# Patient Record
Sex: MALE | Race: WHITE | NOT HISPANIC OR LATINO | Employment: OTHER | URBAN - NONMETROPOLITAN AREA
[De-identification: names, ages, dates, MRNs, and addresses within clinical notes are randomized per-mention and may not be internally consistent; named-entity substitution may affect disease eponyms.]

---

## 2020-01-01 ENCOUNTER — APPOINTMENT (INPATIENT)
Dept: RADIOLOGY | Facility: HOSPITAL | Age: 65
DRG: 291 | End: 2020-01-01
Payer: COMMERCIAL

## 2020-01-01 ENCOUNTER — APPOINTMENT (INPATIENT)
Dept: NON INVASIVE DIAGNOSTICS | Facility: HOSPITAL | Age: 65
DRG: 871 | End: 2020-01-01
Payer: COMMERCIAL

## 2020-01-01 ENCOUNTER — HOSPITAL ENCOUNTER (OUTPATIENT)
Dept: MRI IMAGING | Facility: HOSPITAL | Age: 65
Discharge: HOME | End: 2020-10-07
Payer: COMMERCIAL

## 2020-01-01 ENCOUNTER — APPOINTMENT (INPATIENT)
Dept: MRI IMAGING | Facility: HOSPITAL | Age: 65
DRG: 291 | End: 2020-01-01
Payer: COMMERCIAL

## 2020-01-01 ENCOUNTER — APPOINTMENT (OUTPATIENT)
Dept: LAB | Facility: HOSPITAL | Age: 65
End: 2020-01-01
Attending: INTERNAL MEDICINE
Payer: COMMERCIAL

## 2020-01-01 ENCOUNTER — HOSPITAL ENCOUNTER (OUTPATIENT)
Dept: CT IMAGING | Facility: HOSPITAL | Age: 65
End: 2020-01-01
Payer: COMMERCIAL

## 2020-01-01 ENCOUNTER — APPOINTMENT (INPATIENT)
Dept: ULTRASOUND IMAGING | Facility: HOSPITAL | Age: 65
DRG: 291 | End: 2020-01-01
Payer: COMMERCIAL

## 2020-01-01 ENCOUNTER — HOSPITAL ENCOUNTER (OUTPATIENT)
Dept: CT IMAGING | Facility: HOSPITAL | Age: 65
Discharge: HOME | End: 2020-09-25
Payer: COMMERCIAL

## 2020-01-01 ENCOUNTER — APPOINTMENT (INPATIENT)
Dept: CT IMAGING | Facility: HOSPITAL | Age: 65
DRG: 291 | End: 2020-01-01
Payer: COMMERCIAL

## 2020-01-01 ENCOUNTER — TRANSCRIBE ORDERS (OUTPATIENT)
Dept: ADMINISTRATIVE | Facility: HOSPITAL | Age: 65
End: 2020-01-01

## 2020-01-01 ENCOUNTER — APPOINTMENT (INPATIENT)
Dept: NON INVASIVE DIAGNOSTICS | Facility: HOSPITAL | Age: 65
DRG: 291 | End: 2020-01-01
Payer: COMMERCIAL

## 2020-01-01 ENCOUNTER — HOSPITAL ENCOUNTER (INPATIENT)
Facility: HOSPITAL | Age: 65
LOS: 7 days | DRG: 291 | End: 2020-10-30
Attending: EMERGENCY MEDICINE | Admitting: INTERNAL MEDICINE
Payer: COMMERCIAL

## 2020-01-01 ENCOUNTER — APPOINTMENT (EMERGENCY)
Dept: CT IMAGING | Facility: HOSPITAL | Age: 65
DRG: 291 | End: 2020-01-01
Payer: COMMERCIAL

## 2020-01-01 ENCOUNTER — APPOINTMENT (EMERGENCY)
Dept: RADIOLOGY | Facility: HOSPITAL | Age: 65
DRG: 871 | End: 2020-01-01
Payer: COMMERCIAL

## 2020-01-01 ENCOUNTER — HOSPITAL ENCOUNTER (OUTPATIENT)
Dept: RADIOLOGY | Facility: HOSPITAL | Age: 65
Discharge: HOME | End: 2020-09-25
Payer: COMMERCIAL

## 2020-01-01 ENCOUNTER — APPOINTMENT (INPATIENT)
Dept: RADIOLOGY | Facility: HOSPITAL | Age: 65
DRG: 871 | End: 2020-01-01
Payer: COMMERCIAL

## 2020-01-01 ENCOUNTER — HOSPITAL ENCOUNTER (OUTPATIENT)
Dept: ULTRASOUND IMAGING | Facility: HOSPITAL | Age: 65
Discharge: HOME | End: 2020-09-25
Payer: COMMERCIAL

## 2020-01-01 ENCOUNTER — HOSPITAL ENCOUNTER (OUTPATIENT)
Dept: CT IMAGING | Facility: HOSPITAL | Age: 65
Discharge: HOME | End: 2020-10-07
Payer: COMMERCIAL

## 2020-01-01 ENCOUNTER — HOSPITAL ENCOUNTER (OUTPATIENT)
Dept: NUCLEAR MEDICINE | Facility: HOSPITAL | Age: 65
Discharge: HOME | End: 2020-10-05
Payer: COMMERCIAL

## 2020-01-01 ENCOUNTER — HOSPITAL ENCOUNTER (INPATIENT)
Facility: HOSPITAL | Age: 65
LOS: 4 days | Discharge: HOME | DRG: 871 | End: 2020-08-26
Attending: EMERGENCY MEDICINE | Admitting: INTERNAL MEDICINE
Payer: COMMERCIAL

## 2020-01-01 VITALS
BODY MASS INDEX: 24.73 KG/M2 | WEIGHT: 153.88 LBS | HEIGHT: 66 IN | HEART RATE: 67 BPM | OXYGEN SATURATION: 96 % | SYSTOLIC BLOOD PRESSURE: 158 MMHG | TEMPERATURE: 98.9 F | DIASTOLIC BLOOD PRESSURE: 83 MMHG | RESPIRATION RATE: 20 BRPM

## 2020-01-01 VITALS
TEMPERATURE: 97.9 F | RESPIRATION RATE: 18 BRPM | HEART RATE: 56 BPM | BODY MASS INDEX: 21.92 KG/M2 | DIASTOLIC BLOOD PRESSURE: 67 MMHG | OXYGEN SATURATION: 98 % | SYSTOLIC BLOOD PRESSURE: 82 MMHG | HEIGHT: 66 IN | WEIGHT: 136.38 LBS

## 2020-01-01 DIAGNOSIS — N45.2 ORCHITIS: ICD-10-CM

## 2020-01-01 DIAGNOSIS — R53.1 WEAKNESS: Primary | ICD-10-CM

## 2020-01-01 DIAGNOSIS — E87.1 HYPO-OSMOLALITY AND HYPONATREMIA: ICD-10-CM

## 2020-01-01 DIAGNOSIS — I48.91 NEW ONSET ATRIAL FIBRILLATION (HCC): ICD-10-CM

## 2020-01-01 DIAGNOSIS — I48.91 NEW ONSET A-FIB (HCC): ICD-10-CM

## 2020-01-01 DIAGNOSIS — C80.1 CANCER OF UNKNOWN ORIGIN (HCC): ICD-10-CM

## 2020-01-01 DIAGNOSIS — A41.9 SEPSIS (HCC): ICD-10-CM

## 2020-01-01 DIAGNOSIS — R74.8 ELEVATED SERUM ALKALINE PHOSPHATASE LEVEL: ICD-10-CM

## 2020-01-01 DIAGNOSIS — C80.1 CANCER OF UNKNOWN ORIGIN (HCC): Primary | ICD-10-CM

## 2020-01-01 DIAGNOSIS — I50.21 ACUTE SYSTOLIC CHF (CONGESTIVE HEART FAILURE) (HCC): ICD-10-CM

## 2020-01-01 DIAGNOSIS — R41.82 ALTERED MENTAL STATUS, UNSPECIFIED ALTERED MENTAL STATUS TYPE: ICD-10-CM

## 2020-01-01 DIAGNOSIS — E87.1 HYPONATREMIA: ICD-10-CM

## 2020-01-01 DIAGNOSIS — R33.8 ACUTE RETENTION OF URINE: ICD-10-CM

## 2020-01-01 DIAGNOSIS — C79.51 MALIGNANT NEOPLASM METASTATIC TO BONE (HCC): ICD-10-CM

## 2020-01-01 DIAGNOSIS — R91.8 OTHER NONSPECIFIC ABNORMAL FINDING OF LUNG FIELD: ICD-10-CM

## 2020-01-01 DIAGNOSIS — M54.41 ACUTE BACK PAIN WITH SCIATICA, RIGHT: Primary | ICD-10-CM

## 2020-01-01 DIAGNOSIS — I42.9 CARDIOMYOPATHY, UNSPECIFIED (HCC): ICD-10-CM

## 2020-01-01 DIAGNOSIS — I63.9 CVA (CEREBRAL VASCULAR ACCIDENT) (HCC): ICD-10-CM

## 2020-01-01 DIAGNOSIS — J18.9 PNEUMONIA OF RIGHT LOWER LOBE DUE TO INFECTIOUS ORGANISM: ICD-10-CM

## 2020-01-01 DIAGNOSIS — I48.0 PAROXYSMAL ATRIAL FIBRILLATION (HCC): ICD-10-CM

## 2020-01-01 DIAGNOSIS — M54.41 ACUTE BACK PAIN WITH SCIATICA, RIGHT: ICD-10-CM

## 2020-01-01 DIAGNOSIS — I63.10 CEREBROVASCULAR ACCIDENT (CVA) DUE TO EMBOLISM OF PRECEREBRAL ARTERY (HCC): ICD-10-CM

## 2020-01-01 DIAGNOSIS — M79.606 LEG PAIN: ICD-10-CM

## 2020-01-01 DIAGNOSIS — I50.9 CONGESTIVE HEART FAILURE (CHF) (HCC): ICD-10-CM

## 2020-01-01 DIAGNOSIS — Z87.81 HISTORY OF SPINAL FRACTURE: ICD-10-CM

## 2020-01-01 DIAGNOSIS — M54.41 LUMBAGO WITH SCIATICA, RIGHT SIDE: ICD-10-CM

## 2020-01-01 DIAGNOSIS — R77.8 ELEVATED TROPONIN: ICD-10-CM

## 2020-01-01 DIAGNOSIS — R60.9 PERIPHERAL EDEMA: ICD-10-CM

## 2020-01-01 DIAGNOSIS — I81 PORTAL VEIN THROMBOSIS: ICD-10-CM

## 2020-01-01 DIAGNOSIS — R18.8 ASCITES: ICD-10-CM

## 2020-01-01 DIAGNOSIS — Z86.79 HISTORY OF ATRIAL FIBRILLATION: ICD-10-CM

## 2020-01-01 DIAGNOSIS — J18.9 RIGHT LOWER LOBE PNEUMONIA: ICD-10-CM

## 2020-01-01 DIAGNOSIS — F17.200 NICOTINE DEPENDENCE, UNSPECIFIED, UNCOMPLICATED: ICD-10-CM

## 2020-01-01 DIAGNOSIS — J90 PLEURAL EFFUSION: ICD-10-CM

## 2020-01-01 DIAGNOSIS — I48.91 ATRIAL FIBRILLATION, UNSPECIFIED TYPE (HCC): ICD-10-CM

## 2020-01-01 LAB
ALBUMIN SERPL BCP-MCNC: 1.7 G/DL (ref 3.5–5)
ALBUMIN SERPL BCP-MCNC: 1.9 G/DL (ref 3.5–5)
ALBUMIN SERPL BCP-MCNC: 1.9 G/DL (ref 3.5–5)
ALBUMIN SERPL BCP-MCNC: 2 G/DL (ref 3.5–5)
ALBUMIN SERPL BCP-MCNC: 2.1 G/DL (ref 3.5–5)
ALBUMIN SERPL BCP-MCNC: 2.2 G/DL (ref 3.5–5)
ALBUMIN SERPL BCP-MCNC: 2.3 G/DL (ref 3.5–5)
ALBUMIN SERPL BCP-MCNC: 2.7 G/DL (ref 3.5–5)
ALP SERPL-CCNC: 142 U/L (ref 46–116)
ALP SERPL-CCNC: 202 U/L (ref 46–116)
ALP SERPL-CCNC: 244 U/L (ref 46–116)
ALP SERPL-CCNC: 270 U/L (ref 46–116)
ALP SERPL-CCNC: 276 U/L (ref 46–116)
ALP SERPL-CCNC: 339 U/L (ref 46–116)
ALP SERPL-CCNC: 339 U/L (ref 46–116)
ALP SERPL-CCNC: 346 U/L (ref 46–116)
ALP SERPL-CCNC: 355 U/L (ref 46–116)
ALP SERPL-CCNC: 359 U/L (ref 46–116)
ALP SERPL-CCNC: 381 U/L (ref 46–116)
ALP SERPL-CCNC: 401 U/L (ref 46–116)
ALT SERPL W P-5'-P-CCNC: 31 U/L (ref 12–78)
ALT SERPL W P-5'-P-CCNC: 43 U/L (ref 12–78)
ALT SERPL W P-5'-P-CCNC: 43 U/L (ref 12–78)
ALT SERPL W P-5'-P-CCNC: 45 U/L (ref 12–78)
ALT SERPL W P-5'-P-CCNC: 45 U/L (ref 12–78)
ALT SERPL W P-5'-P-CCNC: 46 U/L (ref 12–78)
ALT SERPL W P-5'-P-CCNC: 46 U/L (ref 12–78)
ALT SERPL W P-5'-P-CCNC: 49 U/L (ref 12–78)
ALT SERPL W P-5'-P-CCNC: 53 U/L (ref 12–78)
ALT SERPL W P-5'-P-CCNC: 54 U/L (ref 12–78)
ALT SERPL W P-5'-P-CCNC: 63 U/L (ref 12–78)
ALT SERPL W P-5'-P-CCNC: 75 U/L (ref 12–78)
AMMONIA PLAS-SCNC: 15 UMOL/L (ref 11–35)
ANION GAP SERPL CALCULATED.3IONS-SCNC: 10 MMOL/L (ref 4–13)
ANION GAP SERPL CALCULATED.3IONS-SCNC: 11 MMOL/L (ref 4–13)
ANION GAP SERPL CALCULATED.3IONS-SCNC: 11 MMOL/L (ref 4–13)
ANION GAP SERPL CALCULATED.3IONS-SCNC: 13 MMOL/L (ref 4–13)
ANION GAP SERPL CALCULATED.3IONS-SCNC: 6 MMOL/L (ref 4–13)
ANION GAP SERPL CALCULATED.3IONS-SCNC: 6 MMOL/L (ref 4–13)
ANION GAP SERPL CALCULATED.3IONS-SCNC: 7 MMOL/L (ref 4–13)
ANION GAP SERPL CALCULATED.3IONS-SCNC: 8 MMOL/L (ref 4–13)
ANION GAP SERPL CALCULATED.3IONS-SCNC: 9 MMOL/L (ref 4–13)
APPEARANCE FLD: CLEAR
AST SERPL W P-5'-P-CCNC: 109 U/L (ref 5–45)
AST SERPL W P-5'-P-CCNC: 208 U/L (ref 5–45)
AST SERPL W P-5'-P-CCNC: 218 U/L (ref 5–45)
AST SERPL W P-5'-P-CCNC: 228 U/L (ref 5–45)
AST SERPL W P-5'-P-CCNC: 237 U/L (ref 5–45)
AST SERPL W P-5'-P-CCNC: 269 U/L (ref 5–45)
AST SERPL W P-5'-P-CCNC: 332 U/L (ref 5–45)
AST SERPL W P-5'-P-CCNC: 383 U/L (ref 5–45)
AST SERPL W P-5'-P-CCNC: 51 U/L (ref 5–45)
AST SERPL W P-5'-P-CCNC: 54 U/L (ref 5–45)
AST SERPL W P-5'-P-CCNC: 84 U/L (ref 5–45)
AST SERPL W P-5'-P-CCNC: 95 U/L (ref 5–45)
ATRIAL RATE: 102 BPM
ATRIAL RATE: 156 BPM
ATRIAL RATE: 71 BPM
BACTERIA BLD CULT: ABNORMAL
BACTERIA BLD CULT: NORMAL
BACTERIA SPEC BFLD CULT: NO GROWTH
BASOPHILS # BLD AUTO: 0.01 THOUSANDS/ΜL (ref 0–0.1)
BASOPHILS # BLD AUTO: 0.02 THOUSANDS/ΜL (ref 0–0.1)
BASOPHILS # BLD AUTO: 0.03 THOUSANDS/ΜL (ref 0–0.1)
BASOPHILS # BLD AUTO: 0.07 THOUSANDS/ΜL (ref 0–0.1)
BASOPHILS # BLD MANUAL: 0 THOUSAND/UL (ref 0–0.1)
BASOPHILS NFR BLD AUTO: 0 % (ref 0–1)
BASOPHILS NFR BLD AUTO: 1 % (ref 0–1)
BASOPHILS NFR MAR MANUAL: 0 % (ref 0–1)
BILIRUB SERPL-MCNC: 0.3 MG/DL (ref 0.2–1)
BILIRUB SERPL-MCNC: 0.31 MG/DL (ref 0.2–1)
BILIRUB SERPL-MCNC: 0.39 MG/DL (ref 0.2–1)
BILIRUB SERPL-MCNC: 0.53 MG/DL (ref 0.2–1)
BILIRUB SERPL-MCNC: 0.67 MG/DL (ref 0.2–1)
BILIRUB SERPL-MCNC: 0.81 MG/DL (ref 0.2–1)
BILIRUB SERPL-MCNC: 0.81 MG/DL (ref 0.2–1)
BILIRUB SERPL-MCNC: 0.87 MG/DL (ref 0.2–1)
BILIRUB SERPL-MCNC: 0.93 MG/DL (ref 0.2–1)
BILIRUB SERPL-MCNC: 0.99 MG/DL (ref 0.2–1)
BILIRUB SERPL-MCNC: 1.01 MG/DL (ref 0.2–1)
BILIRUB SERPL-MCNC: 1.05 MG/DL (ref 0.2–1)
BILIRUB UR QL STRIP: NEGATIVE
BUN SERPL-MCNC: 11 MG/DL (ref 5–25)
BUN SERPL-MCNC: 13 MG/DL (ref 5–25)
BUN SERPL-MCNC: 14 MG/DL (ref 5–25)
BUN SERPL-MCNC: 15 MG/DL (ref 5–25)
BUN SERPL-MCNC: 15 MG/DL (ref 5–25)
BUN SERPL-MCNC: 16 MG/DL (ref 5–25)
BUN SERPL-MCNC: 17 MG/DL (ref 5–25)
BUN SERPL-MCNC: 27 MG/DL (ref 5–25)
BUN SERPL-MCNC: 27 MG/DL (ref 5–25)
BUN SERPL-MCNC: 28 MG/DL (ref 5–25)
BUN SERPL-MCNC: 29 MG/DL (ref 5–25)
BUN SERPL-MCNC: 35 MG/DL (ref 5–25)
BUN SERPL-MCNC: 40 MG/DL (ref 5–25)
BUN SERPL-MCNC: 49 MG/DL (ref 5–25)
BUN SERPL-MCNC: 67 MG/DL (ref 5–25)
CA-I BLD-SCNC: 1.1 MMOL/L (ref 1.12–1.32)
CALCIUM ALBUM COR SERPL-MCNC: 10.4 MG/DL (ref 8.3–10.1)
CALCIUM ALBUM COR SERPL-MCNC: 10.4 MG/DL (ref 8.3–10.1)
CALCIUM ALBUM COR SERPL-MCNC: 10.5 MG/DL (ref 8.3–10.1)
CALCIUM ALBUM COR SERPL-MCNC: 9.9 MG/DL (ref 8.3–10.1)
CALCIUM SERPL-MCNC: 7.5 MG/DL (ref 8.3–10.1)
CALCIUM SERPL-MCNC: 7.7 MG/DL (ref 8.3–10.1)
CALCIUM SERPL-MCNC: 7.7 MG/DL (ref 8.3–10.1)
CALCIUM SERPL-MCNC: 7.8 MG/DL (ref 8.3–10.1)
CALCIUM SERPL-MCNC: 7.9 MG/DL (ref 8.3–10.1)
CALCIUM SERPL-MCNC: 7.9 MG/DL (ref 8.3–10.1)
CALCIUM SERPL-MCNC: 8.1 MG/DL (ref 8.3–10.1)
CALCIUM SERPL-MCNC: 8.3 MG/DL (ref 8.3–10.1)
CALCIUM SERPL-MCNC: 8.5 MG/DL (ref 8.3–10.1)
CALCIUM SERPL-MCNC: 8.7 MG/DL (ref 8.3–10.1)
CALCIUM SERPL-MCNC: 8.7 MG/DL (ref 8.3–10.1)
CALCIUM SERPL-MCNC: 8.9 MG/DL (ref 8.3–10.1)
CALCIUM SERPL-MCNC: 9 MG/DL (ref 8.3–10.1)
CALCIUM SERPL-MCNC: 9.1 MG/DL (ref 8.3–10.1)
CALCIUM SERPL-MCNC: 9.1 MG/DL (ref 8.3–10.1)
CHLORIDE SERPL-SCNC: 89 MMOL/L (ref 100–108)
CHLORIDE SERPL-SCNC: 92 MMOL/L (ref 100–108)
CHLORIDE SERPL-SCNC: 93 MMOL/L (ref 100–108)
CHLORIDE SERPL-SCNC: 94 MMOL/L (ref 100–108)
CHLORIDE SERPL-SCNC: 95 MMOL/L (ref 100–108)
CHLORIDE SERPL-SCNC: 96 MMOL/L (ref 100–108)
CHLORIDE SERPL-SCNC: 97 MMOL/L (ref 100–108)
CHLORIDE SERPL-SCNC: 98 MMOL/L (ref 100–108)
CHOLEST SERPL-MCNC: 110 MG/DL (ref 50–200)
CLARITY UR: CLEAR
CO2 SERPL-SCNC: 23 MMOL/L (ref 21–32)
CO2 SERPL-SCNC: 24 MMOL/L (ref 21–32)
CO2 SERPL-SCNC: 24 MMOL/L (ref 21–32)
CO2 SERPL-SCNC: 26 MMOL/L (ref 21–32)
CO2 SERPL-SCNC: 27 MMOL/L (ref 21–32)
CO2 SERPL-SCNC: 27 MMOL/L (ref 21–32)
CO2 SERPL-SCNC: 28 MMOL/L (ref 21–32)
CO2 SERPL-SCNC: 29 MMOL/L (ref 21–32)
CO2 SERPL-SCNC: 30 MMOL/L (ref 21–32)
CO2 SERPL-SCNC: 31 MMOL/L (ref 21–32)
COLOR FLD: YELLOW
COLOR UR: YELLOW
CREAT SERPL-MCNC: 0.84 MG/DL (ref 0.6–1.3)
CREAT SERPL-MCNC: 0.86 MG/DL (ref 0.6–1.3)
CREAT SERPL-MCNC: 0.88 MG/DL (ref 0.6–1.3)
CREAT SERPL-MCNC: 0.89 MG/DL (ref 0.6–1.3)
CREAT SERPL-MCNC: 0.99 MG/DL (ref 0.6–1.3)
CREAT SERPL-MCNC: 1.08 MG/DL (ref 0.6–1.3)
CREAT SERPL-MCNC: 1.08 MG/DL (ref 0.6–1.3)
CREAT SERPL-MCNC: 1.09 MG/DL (ref 0.6–1.3)
CREAT SERPL-MCNC: 1.11 MG/DL (ref 0.6–1.3)
CREAT SERPL-MCNC: 1.12 MG/DL (ref 0.6–1.3)
CREAT SERPL-MCNC: 1.12 MG/DL (ref 0.6–1.3)
CREAT SERPL-MCNC: 1.15 MG/DL (ref 0.6–1.3)
CREAT SERPL-MCNC: 1.16 MG/DL (ref 0.6–1.3)
CREAT SERPL-MCNC: 1.16 MG/DL (ref 0.6–1.3)
CREAT SERPL-MCNC: 1.45 MG/DL (ref 0.6–1.3)
CREAT SERPL-MCNC: 1.58 MG/DL (ref 0.6–1.3)
CREAT SERPL-MCNC: 2.35 MG/DL (ref 0.6–1.3)
DIGOXIN SERPL-MCNC: 1.1 NG/ML (ref 0.8–2)
EOSINOPHIL # BLD AUTO: 0 THOUSAND/ΜL (ref 0–0.61)
EOSINOPHIL # BLD AUTO: 0 THOUSAND/ΜL (ref 0–0.61)
EOSINOPHIL # BLD AUTO: 0.01 THOUSAND/ΜL (ref 0–0.61)
EOSINOPHIL # BLD AUTO: 0.03 THOUSAND/ΜL (ref 0–0.61)
EOSINOPHIL # BLD AUTO: 0.03 THOUSAND/ΜL (ref 0–0.61)
EOSINOPHIL # BLD MANUAL: 0 THOUSAND/UL (ref 0–0.4)
EOSINOPHIL NFR BLD AUTO: 0 % (ref 0–6)
EOSINOPHIL NFR BLD MANUAL: 0 % (ref 0–6)
ERYTHROCYTE [DISTWIDTH] IN BLOOD BY AUTOMATED COUNT: 13 % (ref 11.6–15.1)
ERYTHROCYTE [DISTWIDTH] IN BLOOD BY AUTOMATED COUNT: 13.3 % (ref 11.6–15.1)
ERYTHROCYTE [DISTWIDTH] IN BLOOD BY AUTOMATED COUNT: 13.4 % (ref 11.6–15.1)
ERYTHROCYTE [DISTWIDTH] IN BLOOD BY AUTOMATED COUNT: 13.4 % (ref 11.6–15.1)
ERYTHROCYTE [DISTWIDTH] IN BLOOD BY AUTOMATED COUNT: 16.9 % (ref 11.6–15.1)
ERYTHROCYTE [DISTWIDTH] IN BLOOD BY AUTOMATED COUNT: 17 % (ref 11.6–15.1)
ERYTHROCYTE [DISTWIDTH] IN BLOOD BY AUTOMATED COUNT: 17.1 % (ref 11.6–15.1)
ERYTHROCYTE [DISTWIDTH] IN BLOOD BY AUTOMATED COUNT: 17.2 % (ref 11.6–15.1)
ERYTHROCYTE [DISTWIDTH] IN BLOOD BY AUTOMATED COUNT: 17.6 % (ref 11.6–15.1)
EST. AVERAGE GLUCOSE BLD GHB EST-MCNC: 131 MG/DL
ETHANOL SERPL-MCNC: <3 MG/DL (ref 0–3)
GFR SERPL CREATININE-BSD FRML MDRD: 28 ML/MIN/1.73SQ M
GFR SERPL CREATININE-BSD FRML MDRD: 45 ML/MIN/1.73SQ M
GFR SERPL CREATININE-BSD FRML MDRD: 50 ML/MIN/1.73SQ M
GFR SERPL CREATININE-BSD FRML MDRD: 65 ML/MIN/1.73SQ M
GFR SERPL CREATININE-BSD FRML MDRD: 65 ML/MIN/1.73SQ M
GFR SERPL CREATININE-BSD FRML MDRD: 66 ML/MIN/1.73SQ M
GFR SERPL CREATININE-BSD FRML MDRD: 68 ML/MIN/1.73SQ M
GFR SERPL CREATININE-BSD FRML MDRD: 68 ML/MIN/1.73SQ M
GFR SERPL CREATININE-BSD FRML MDRD: 69 ML/MIN/1.73SQ M
GFR SERPL CREATININE-BSD FRML MDRD: 70 ML/MIN/1.73SQ M
GFR SERPL CREATININE-BSD FRML MDRD: 71 ML/MIN/1.73SQ M
GFR SERPL CREATININE-BSD FRML MDRD: 71 ML/MIN/1.73SQ M
GFR SERPL CREATININE-BSD FRML MDRD: 79 ML/MIN/1.73SQ M
GFR SERPL CREATININE-BSD FRML MDRD: 89 ML/MIN/1.73SQ M
GFR SERPL CREATININE-BSD FRML MDRD: 90 ML/MIN/1.73SQ M
GFR SERPL CREATININE-BSD FRML MDRD: 90 ML/MIN/1.73SQ M
GFR SERPL CREATININE-BSD FRML MDRD: 91 ML/MIN/1.73SQ M
GLUCOSE P FAST SERPL-MCNC: 100 MG/DL (ref 65–99)
GLUCOSE SERPL-MCNC: 103 MG/DL (ref 65–140)
GLUCOSE SERPL-MCNC: 103 MG/DL (ref 65–140)
GLUCOSE SERPL-MCNC: 104 MG/DL (ref 65–140)
GLUCOSE SERPL-MCNC: 106 MG/DL (ref 65–140)
GLUCOSE SERPL-MCNC: 108 MG/DL (ref 65–140)
GLUCOSE SERPL-MCNC: 112 MG/DL (ref 65–140)
GLUCOSE SERPL-MCNC: 115 MG/DL (ref 65–140)
GLUCOSE SERPL-MCNC: 117 MG/DL (ref 65–140)
GLUCOSE SERPL-MCNC: 130 MG/DL (ref 65–140)
GLUCOSE SERPL-MCNC: 138 MG/DL (ref 65–140)
GLUCOSE SERPL-MCNC: 148 MG/DL (ref 65–140)
GLUCOSE SERPL-MCNC: 153 MG/DL (ref 65–140)
GLUCOSE SERPL-MCNC: 194 MG/DL (ref 65–140)
GLUCOSE SERPL-MCNC: 87 MG/DL (ref 65–140)
GLUCOSE SERPL-MCNC: 88 MG/DL (ref 65–140)
GLUCOSE SERPL-MCNC: 99 MG/DL (ref 65–140)
GLUCOSE UR STRIP-MCNC: NEGATIVE MG/DL
GRAM STN SPEC: ABNORMAL
GRAM STN SPEC: ABNORMAL
HBA1C MFR BLD: 6.2 %
HCT VFR BLD AUTO: 38.3 % (ref 36.5–49.3)
HCT VFR BLD AUTO: 39.4 % (ref 36.5–49.3)
HCT VFR BLD AUTO: 40.1 % (ref 36.5–49.3)
HCT VFR BLD AUTO: 40.5 % (ref 36.5–49.3)
HCT VFR BLD AUTO: 40.8 % (ref 36.5–49.3)
HCT VFR BLD AUTO: 41.1 % (ref 36.5–49.3)
HCT VFR BLD AUTO: 41.7 % (ref 36.5–49.3)
HCT VFR BLD AUTO: 42 % (ref 36.5–49.3)
HCT VFR BLD AUTO: 43.1 % (ref 36.5–49.3)
HCT VFR BLD AUTO: 44.5 % (ref 36.5–49.3)
HCT VFR BLD AUTO: 46.6 % (ref 36.5–49.3)
HCV AB SER QL: NORMAL
HDLC SERPL-MCNC: 16 MG/DL
HGB BLD-MCNC: 12.8 G/DL (ref 12–17)
HGB BLD-MCNC: 13.3 G/DL (ref 12–17)
HGB BLD-MCNC: 13.4 G/DL (ref 12–17)
HGB BLD-MCNC: 13.5 G/DL (ref 12–17)
HGB BLD-MCNC: 13.6 G/DL (ref 12–17)
HGB BLD-MCNC: 13.7 G/DL (ref 12–17)
HGB BLD-MCNC: 14 G/DL (ref 12–17)
HGB BLD-MCNC: 14.4 G/DL (ref 12–17)
HGB BLD-MCNC: 14.5 G/DL (ref 12–17)
HGB BLD-MCNC: 14.9 G/DL (ref 12–17)
HGB BLD-MCNC: 16.1 G/DL (ref 12–17)
HGB UR QL STRIP.AUTO: NEGATIVE
IMM GRANULOCYTES # BLD AUTO: 0.06 THOUSAND/UL (ref 0–0.2)
IMM GRANULOCYTES # BLD AUTO: 0.08 THOUSAND/UL (ref 0–0.2)
IMM GRANULOCYTES # BLD AUTO: 0.09 THOUSAND/UL (ref 0–0.2)
IMM GRANULOCYTES # BLD AUTO: 0.11 THOUSAND/UL (ref 0–0.2)
IMM GRANULOCYTES # BLD AUTO: 0.12 THOUSAND/UL (ref 0–0.2)
IMM GRANULOCYTES # BLD AUTO: 0.13 THOUSAND/UL (ref 0–0.2)
IMM GRANULOCYTES # BLD AUTO: 0.15 THOUSAND/UL (ref 0–0.2)
IMM GRANULOCYTES # BLD AUTO: 0.2 THOUSAND/UL (ref 0–0.2)
IMM GRANULOCYTES NFR BLD AUTO: 1 % (ref 0–2)
KETONES UR STRIP-MCNC: NEGATIVE MG/DL
L PNEUMO1 AG UR QL IA.RAPID: NEGATIVE
LACTATE SERPL-SCNC: 1.5 MMOL/L (ref 0.5–2)
LACTATE SERPL-SCNC: 2.2 MMOL/L (ref 0.5–2)
LACTATE SERPL-SCNC: 2.9 MMOL/L (ref 0.5–2)
LDH FLD L TO P-CCNC: 240 U/L
LDLC SERPL CALC-MCNC: 65 MG/DL (ref 0–100)
LEUKOCYTE ESTERASE UR QL STRIP: NEGATIVE
LYMPHOCYTES # BLD AUTO: 0.62 THOUSANDS/ΜL (ref 0.6–4.47)
LYMPHOCYTES # BLD AUTO: 0.82 THOUSANDS/ΜL (ref 0.6–4.47)
LYMPHOCYTES # BLD AUTO: 0.84 THOUSANDS/ΜL (ref 0.6–4.47)
LYMPHOCYTES # BLD AUTO: 0.96 THOUSANDS/ΜL (ref 0.6–4.47)
LYMPHOCYTES # BLD AUTO: 1 THOUSANDS/ΜL (ref 0.6–4.47)
LYMPHOCYTES # BLD AUTO: 1.15 THOUSANDS/ΜL (ref 0.6–4.47)
LYMPHOCYTES # BLD AUTO: 1.2 THOUSANDS/ΜL (ref 0.6–4.47)
LYMPHOCYTES # BLD AUTO: 1.61 THOUSAND/UL (ref 0.6–4.47)
LYMPHOCYTES # BLD AUTO: 10 % (ref 14–44)
LYMPHOCYTES # BLD AUTO: 3.39 THOUSANDS/ΜL (ref 0.6–4.47)
LYMPHOCYTES NFR BLD AUTO: 24 % (ref 14–44)
LYMPHOCYTES NFR BLD AUTO: 47 %
LYMPHOCYTES NFR BLD AUTO: 5 % (ref 14–44)
LYMPHOCYTES NFR BLD AUTO: 5 % (ref 14–44)
LYMPHOCYTES NFR BLD AUTO: 6 % (ref 14–44)
LYMPHOCYTES NFR BLD AUTO: 7 % (ref 14–44)
MAGNESIUM SERPL-MCNC: 1.5 MG/DL (ref 1.6–2.6)
MAGNESIUM SERPL-MCNC: 1.8 MG/DL (ref 1.6–2.6)
MAGNESIUM SERPL-MCNC: 2 MG/DL (ref 1.6–2.6)
MAGNESIUM SERPL-MCNC: 2.3 MG/DL (ref 1.6–2.6)
MCH RBC QN AUTO: 28.8 PG (ref 26.8–34.3)
MCH RBC QN AUTO: 28.8 PG (ref 26.8–34.3)
MCH RBC QN AUTO: 28.9 PG (ref 26.8–34.3)
MCH RBC QN AUTO: 28.9 PG (ref 26.8–34.3)
MCH RBC QN AUTO: 29 PG (ref 26.8–34.3)
MCH RBC QN AUTO: 29.2 PG (ref 26.8–34.3)
MCH RBC QN AUTO: 29.3 PG (ref 26.8–34.3)
MCH RBC QN AUTO: 29.4 PG (ref 26.8–34.3)
MCH RBC QN AUTO: 29.4 PG (ref 26.8–34.3)
MCH RBC QN AUTO: 29.5 PG (ref 26.8–34.3)
MCH RBC QN AUTO: 29.6 PG (ref 26.8–34.3)
MCHC RBC AUTO-ENTMCNC: 33.1 G/DL (ref 31.4–37.4)
MCHC RBC AUTO-ENTMCNC: 33.1 G/DL (ref 31.4–37.4)
MCHC RBC AUTO-ENTMCNC: 33.2 G/DL (ref 31.4–37.4)
MCHC RBC AUTO-ENTMCNC: 33.4 G/DL (ref 31.4–37.4)
MCHC RBC AUTO-ENTMCNC: 33.5 G/DL (ref 31.4–37.4)
MCHC RBC AUTO-ENTMCNC: 33.6 G/DL (ref 31.4–37.4)
MCHC RBC AUTO-ENTMCNC: 33.6 G/DL (ref 31.4–37.4)
MCHC RBC AUTO-ENTMCNC: 33.8 G/DL (ref 31.4–37.4)
MCHC RBC AUTO-ENTMCNC: 34 G/DL (ref 31.4–37.4)
MCHC RBC AUTO-ENTMCNC: 34.3 G/DL (ref 31.4–37.4)
MCHC RBC AUTO-ENTMCNC: 34.5 G/DL (ref 31.4–37.4)
MCV RBC AUTO: 84 FL (ref 82–98)
MCV RBC AUTO: 85 FL (ref 82–98)
MCV RBC AUTO: 87 FL (ref 82–98)
MCV RBC AUTO: 88 FL (ref 82–98)
MCV RBC AUTO: 88 FL (ref 82–98)
MCV RBC AUTO: 89 FL (ref 82–98)
MCV RBC AUTO: 90 FL (ref 82–98)
MONO+MESO NFR FLD MANUAL: 12 %
MONOCYTES # BLD AUTO: 0.97 THOUSAND/UL (ref 0–1.22)
MONOCYTES # BLD AUTO: 1.03 THOUSAND/ΜL (ref 0.17–1.22)
MONOCYTES # BLD AUTO: 1.34 THOUSAND/ΜL (ref 0.17–1.22)
MONOCYTES # BLD AUTO: 1.46 THOUSAND/ΜL (ref 0.17–1.22)
MONOCYTES # BLD AUTO: 1.49 THOUSAND/ΜL (ref 0.17–1.22)
MONOCYTES # BLD AUTO: 1.61 THOUSAND/ΜL (ref 0.17–1.22)
MONOCYTES # BLD AUTO: 1.67 THOUSAND/ΜL (ref 0.17–1.22)
MONOCYTES # BLD AUTO: 1.86 THOUSAND/ΜL (ref 0.17–1.22)
MONOCYTES # BLD AUTO: 1.94 THOUSAND/ΜL (ref 0.17–1.22)
MONOCYTES NFR BLD AUTO: 10 % (ref 4–12)
MONOCYTES NFR BLD AUTO: 11 % (ref 4–12)
MONOCYTES NFR BLD AUTO: 11 % (ref 4–12)
MONOCYTES NFR BLD AUTO: 28 %
MONOCYTES NFR BLD AUTO: 9 % (ref 4–12)
MONOCYTES NFR BLD: 6 % (ref 4–12)
NEUTROPHILS # BLD AUTO: 11.86 THOUSANDS/ΜL (ref 1.85–7.62)
NEUTROPHILS # BLD AUTO: 11.95 THOUSANDS/ΜL (ref 1.85–7.62)
NEUTROPHILS # BLD AUTO: 12.11 THOUSANDS/ΜL (ref 1.85–7.62)
NEUTROPHILS # BLD AUTO: 13.58 THOUSANDS/ΜL (ref 1.85–7.62)
NEUTROPHILS # BLD AUTO: 14.28 THOUSANDS/ΜL (ref 1.85–7.62)
NEUTROPHILS # BLD AUTO: 14.69 THOUSANDS/ΜL (ref 1.85–7.62)
NEUTROPHILS # BLD AUTO: 9.31 THOUSANDS/ΜL (ref 1.85–7.62)
NEUTROPHILS # BLD AUTO: 9.73 THOUSANDS/ΜL (ref 1.85–7.62)
NEUTROPHILS # BLD MANUAL: 13.07 THOUSAND/UL (ref 1.85–7.62)
NEUTS BAND NFR BLD MANUAL: 1 % (ref 0–8)
NEUTS SEG NFR BLD AUTO: 13 %
NEUTS SEG NFR BLD AUTO: 64 % (ref 43–75)
NEUTS SEG NFR BLD AUTO: 80 % (ref 43–75)
NEUTS SEG NFR BLD AUTO: 81 % (ref 43–75)
NEUTS SEG NFR BLD AUTO: 81 % (ref 43–75)
NEUTS SEG NFR BLD AUTO: 82 % (ref 43–75)
NEUTS SEG NFR BLD AUTO: 83 % (ref 43–75)
NEUTS SEG NFR BLD AUTO: 83 % (ref 43–75)
NEUTS SEG NFR BLD AUTO: 84 % (ref 43–75)
NEUTS SEG NFR BLD AUTO: 84 % (ref 43–75)
NITRITE UR QL STRIP: NEGATIVE
NONHDLC SERPL-MCNC: 94 MG/DL
NRBC BLD AUTO-RTO: 0 /100 WBCS
NT-PROBNP SERPL-MCNC: 2172 PG/ML
OSMOLALITY UR/SERPL-RTO: 278 MMOL/KG (ref 282–298)
OSMOLALITY UR: 329 MMOL/KG
P AXIS: 50 DEGREES
P AXIS: 53 DEGREES
PH BODY FLUID: 7.6
PH UR STRIP.AUTO: 5 [PH]
PLATELET # BLD AUTO: 101 THOUSANDS/UL (ref 149–390)
PLATELET # BLD AUTO: 105 THOUSANDS/UL (ref 149–390)
PLATELET # BLD AUTO: 107 THOUSANDS/UL (ref 149–390)
PLATELET # BLD AUTO: 111 THOUSANDS/UL (ref 149–390)
PLATELET # BLD AUTO: 118 THOUSANDS/UL (ref 149–390)
PLATELET # BLD AUTO: 135 THOUSANDS/UL (ref 149–390)
PLATELET # BLD AUTO: 147 THOUSANDS/UL (ref 149–390)
PLATELET # BLD AUTO: 195 THOUSANDS/UL (ref 149–390)
PLATELET # BLD AUTO: 239 THOUSANDS/UL (ref 149–390)
PLATELET # BLD AUTO: 244 THOUSANDS/UL (ref 149–390)
PLATELET # BLD AUTO: 254 THOUSANDS/UL (ref 149–390)
PLATELET BLD QL SMEAR: ADEQUATE
PMV BLD AUTO: 10 FL (ref 8.9–12.7)
PMV BLD AUTO: 10.1 FL (ref 8.9–12.7)
PMV BLD AUTO: 10.1 FL (ref 8.9–12.7)
PMV BLD AUTO: 10.3 FL (ref 8.9–12.7)
PMV BLD AUTO: 10.8 FL (ref 8.9–12.7)
PMV BLD AUTO: 10.9 FL (ref 8.9–12.7)
PMV BLD AUTO: 9.7 FL (ref 8.9–12.7)
PMV BLD AUTO: 9.8 FL (ref 8.9–12.7)
PMV BLD AUTO: 9.8 FL (ref 8.9–12.7)
POTASSIUM SERPL-SCNC: 3.2 MMOL/L (ref 3.5–5.3)
POTASSIUM SERPL-SCNC: 3.3 MMOL/L (ref 3.5–5.3)
POTASSIUM SERPL-SCNC: 3.3 MMOL/L (ref 3.5–5.3)
POTASSIUM SERPL-SCNC: 3.7 MMOL/L (ref 3.5–5.3)
POTASSIUM SERPL-SCNC: 3.8 MMOL/L (ref 3.5–5.3)
POTASSIUM SERPL-SCNC: 3.9 MMOL/L (ref 3.5–5.3)
POTASSIUM SERPL-SCNC: 4 MMOL/L (ref 3.5–5.3)
POTASSIUM SERPL-SCNC: 4 MMOL/L (ref 3.5–5.3)
POTASSIUM SERPL-SCNC: 4.1 MMOL/L (ref 3.5–5.3)
POTASSIUM SERPL-SCNC: 4.2 MMOL/L (ref 3.5–5.3)
POTASSIUM SERPL-SCNC: 4.3 MMOL/L (ref 3.5–5.3)
POTASSIUM SERPL-SCNC: 4.3 MMOL/L (ref 3.5–5.3)
POTASSIUM SERPL-SCNC: 4.4 MMOL/L (ref 3.5–5.3)
POTASSIUM SERPL-SCNC: 4.4 MMOL/L (ref 3.5–5.3)
POTASSIUM SERPL-SCNC: 5 MMOL/L (ref 3.5–5.3)
PR INTERVAL: 120 MS
PR INTERVAL: 128 MS
PROCALCITONIN SERPL-MCNC: 0.28 NG/ML
PROCALCITONIN SERPL-MCNC: 0.48 NG/ML
PROCALCITONIN SERPL-MCNC: 0.64 NG/ML
PROCALCITONIN SERPL-MCNC: 1.82 NG/ML
PROCALCITONIN SERPL-MCNC: 1.92 NG/ML
PROCALCITONIN SERPL-MCNC: 2.06 NG/ML
PROCALCITONIN SERPL-MCNC: 3.07 NG/ML
PROT FLD-MCNC: 3 G/DL
PROT SERPL-MCNC: 5.5 G/DL (ref 6.4–8.2)
PROT SERPL-MCNC: 5.9 G/DL (ref 6.4–8.2)
PROT SERPL-MCNC: 6 G/DL (ref 6.4–8.2)
PROT SERPL-MCNC: 6.1 G/DL (ref 6.4–8.2)
PROT SERPL-MCNC: 6.1 G/DL (ref 6.4–8.2)
PROT SERPL-MCNC: 6.2 G/DL (ref 6.4–8.2)
PROT SERPL-MCNC: 6.3 G/DL (ref 6.4–8.2)
PROT SERPL-MCNC: 6.5 G/DL (ref 6.4–8.2)
PROT SERPL-MCNC: 7 G/DL (ref 6.4–8.2)
PROT SERPL-MCNC: 7.3 G/DL (ref 6.4–8.2)
PROT UR STRIP-MCNC: NEGATIVE MG/DL
PTH-INTACT SERPL-MCNC: 6.7 PG/ML (ref 18.4–80.1)
QRS AXIS: 105 DEGREES
QRS AXIS: 84 DEGREES
QRS AXIS: 98 DEGREES
QRSD INTERVAL: 80 MS
QRSD INTERVAL: 86 MS
QRSD INTERVAL: 86 MS
QT INTERVAL: 292 MS
QT INTERVAL: 324 MS
QT INTERVAL: 366 MS
QTC INTERVAL: 397 MS
QTC INTERVAL: 422 MS
QTC INTERVAL: 479 MS
RBC # BLD AUTO: 4.36 MILLION/UL (ref 3.88–5.62)
RBC # BLD AUTO: 4.53 MILLION/UL (ref 3.88–5.62)
RBC # BLD AUTO: 4.56 MILLION/UL (ref 3.88–5.62)
RBC # BLD AUTO: 4.64 MILLION/UL (ref 3.88–5.62)
RBC # BLD AUTO: 4.72 MILLION/UL (ref 3.88–5.62)
RBC # BLD AUTO: 4.76 MILLION/UL (ref 3.88–5.62)
RBC # BLD AUTO: 4.78 MILLION/UL (ref 3.88–5.62)
RBC # BLD AUTO: 4.91 MILLION/UL (ref 3.88–5.62)
RBC # BLD AUTO: 4.96 MILLION/UL (ref 3.88–5.62)
RBC # BLD AUTO: 5.1 MILLION/UL (ref 3.88–5.62)
RBC # BLD AUTO: 5.57 MILLION/UL (ref 3.88–5.62)
RBC MORPH BLD: NORMAL
S PNEUM AG UR QL: NEGATIVE
SARS-COV-2 RNA RESP QL NAA+PROBE: NEGATIVE
SITE: NORMAL
SODIUM 24H UR-SCNC: 28 MOL/L
SODIUM SERPL-SCNC: 127 MMOL/L (ref 136–145)
SODIUM SERPL-SCNC: 127 MMOL/L (ref 136–145)
SODIUM SERPL-SCNC: 128 MMOL/L (ref 136–145)
SODIUM SERPL-SCNC: 129 MMOL/L (ref 136–145)
SODIUM SERPL-SCNC: 129 MMOL/L (ref 136–145)
SODIUM SERPL-SCNC: 130 MMOL/L (ref 136–145)
SODIUM SERPL-SCNC: 131 MMOL/L (ref 136–145)
SODIUM SERPL-SCNC: 132 MMOL/L (ref 136–145)
SODIUM SERPL-SCNC: 133 MMOL/L (ref 136–145)
SP GR UR STRIP.AUTO: 1.02 (ref 1–1.03)
T WAVE AXIS: -11 DEGREES
T WAVE AXIS: 40 DEGREES
T WAVE AXIS: 48 DEGREES
TOTAL CELLS COUNTED SPEC: 100
TOTAL CELLS COUNTED SPEC: 100
TRIGL SERPL-MCNC: 146 MG/DL
TROPONIN I SERPL-MCNC: 0.1 NG/ML
TROPONIN I SERPL-MCNC: 0.11 NG/ML
TROPONIN I SERPL-MCNC: 0.11 NG/ML
TROPONIN I SERPL-MCNC: 0.13 NG/ML
TROPONIN I SERPL-MCNC: <0.02 NG/ML
TSH SERPL DL<=0.05 MIU/L-ACNC: 2.82 UIU/ML (ref 0.36–3.74)
UROBILINOGEN UR QL STRIP.AUTO: 0.2 E.U./DL
VARIANT LYMPHS # BLD AUTO: 3 %
VENTRICULAR RATE: 102 BPM
VENTRICULAR RATE: 162 BPM
VENTRICULAR RATE: 71 BPM
WBC # BLD AUTO: 11.65 THOUSAND/UL (ref 4.31–10.16)
WBC # BLD AUTO: 12.85 THOUSAND/UL (ref 4.31–10.16)
WBC # BLD AUTO: 13.89 THOUSAND/UL (ref 4.31–10.16)
WBC # BLD AUTO: 14.4 THOUSAND/UL (ref 4.31–10.16)
WBC # BLD AUTO: 14.53 THOUSAND/UL (ref 4.31–10.16)
WBC # BLD AUTO: 14.82 THOUSAND/UL (ref 4.31–10.16)
WBC # BLD AUTO: 15.07 THOUSAND/UL (ref 4.31–10.16)
WBC # BLD AUTO: 16.13 THOUSAND/UL (ref 4.31–10.16)
WBC # BLD AUTO: 16.23 THOUSAND/UL (ref 4.31–10.16)
WBC # BLD AUTO: 17.62 THOUSAND/UL (ref 4.31–10.16)
WBC # BLD AUTO: 17.94 THOUSAND/UL (ref 4.31–10.16)
WBC # FLD MANUAL: 202 /UL

## 2020-01-01 PROCEDURE — 74177 CT ABD & PELVIS W/CONTRAST: CPT

## 2020-01-01 PROCEDURE — 84145 PROCALCITONIN (PCT): CPT | Performed by: FAMILY MEDICINE

## 2020-01-01 PROCEDURE — 99239 HOSP IP/OBS DSCHRG MGMT >30: CPT | Performed by: FAMILY MEDICINE

## 2020-01-01 PROCEDURE — 93005 ELECTROCARDIOGRAM TRACING: CPT

## 2020-01-01 PROCEDURE — 80053 COMPREHEN METABOLIC PANEL: CPT | Performed by: EMERGENCY MEDICINE

## 2020-01-01 PROCEDURE — 80048 BASIC METABOLIC PNL TOTAL CA: CPT | Performed by: FAMILY MEDICINE

## 2020-01-01 PROCEDURE — G1004 CDSM NDSC: HCPCS

## 2020-01-01 PROCEDURE — 94668 MNPJ CHEST WALL SBSQ: CPT

## 2020-01-01 PROCEDURE — 87205 SMEAR GRAM STAIN: CPT | Performed by: INTERNAL MEDICINE

## 2020-01-01 PROCEDURE — 36415 COLL VENOUS BLD VENIPUNCTURE: CPT | Performed by: EMERGENCY MEDICINE

## 2020-01-01 PROCEDURE — 76870 US EXAM SCROTUM: CPT

## 2020-01-01 PROCEDURE — 87040 BLOOD CULTURE FOR BACTERIA: CPT | Performed by: EMERGENCY MEDICINE

## 2020-01-01 PROCEDURE — 84145 PROCALCITONIN (PCT): CPT | Performed by: INTERNAL MEDICINE

## 2020-01-01 PROCEDURE — 96374 THER/PROPH/DIAG INJ IV PUSH: CPT

## 2020-01-01 PROCEDURE — 85025 COMPLETE CBC W/AUTO DIFF WBC: CPT | Performed by: FAMILY MEDICINE

## 2020-01-01 PROCEDURE — 72156 MRI NECK SPINE W/O & W/DYE: CPT

## 2020-01-01 PROCEDURE — 83930 ASSAY OF BLOOD OSMOLALITY: CPT | Performed by: NURSE PRACTITIONER

## 2020-01-01 PROCEDURE — 80053 COMPREHEN METABOLIC PANEL: CPT | Performed by: NURSE PRACTITIONER

## 2020-01-01 PROCEDURE — 99233 SBSQ HOSP IP/OBS HIGH 50: CPT | Performed by: FAMILY MEDICINE

## 2020-01-01 PROCEDURE — 76770 US EXAM ABDO BACK WALL COMP: CPT

## 2020-01-01 PROCEDURE — 93308 TTE F-UP OR LMTD: CPT

## 2020-01-01 PROCEDURE — 97163 PT EVAL HIGH COMPLEX 45 MIN: CPT

## 2020-01-01 PROCEDURE — 84484 ASSAY OF TROPONIN QUANT: CPT | Performed by: EMERGENCY MEDICINE

## 2020-01-01 PROCEDURE — 93325 DOPPLER ECHO COLOR FLOW MAPG: CPT | Performed by: INTERNAL MEDICINE

## 2020-01-01 PROCEDURE — 83735 ASSAY OF MAGNESIUM: CPT | Performed by: INTERNAL MEDICINE

## 2020-01-01 PROCEDURE — 99223 1ST HOSP IP/OBS HIGH 75: CPT | Performed by: FAMILY MEDICINE

## 2020-01-01 PROCEDURE — 99285 EMERGENCY DEPT VISIT HI MDM: CPT

## 2020-01-01 PROCEDURE — 80061 LIPID PANEL: CPT | Performed by: FAMILY MEDICINE

## 2020-01-01 PROCEDURE — 99233 SBSQ HOSP IP/OBS HIGH 50: CPT | Performed by: INTERNAL MEDICINE

## 2020-01-01 PROCEDURE — 85025 COMPLETE CBC W/AUTO DIFF WBC: CPT | Performed by: INTERNAL MEDICINE

## 2020-01-01 PROCEDURE — 87449 NOS EACH ORGANISM AG IA: CPT | Performed by: FAMILY MEDICINE

## 2020-01-01 PROCEDURE — 80053 COMPREHEN METABOLIC PANEL: CPT | Performed by: INTERNAL MEDICINE

## 2020-01-01 PROCEDURE — 32555 ASPIRATE PLEURA W/ IMAGING: CPT | Performed by: NURSE PRACTITIONER

## 2020-01-01 PROCEDURE — 97167 OT EVAL HIGH COMPLEX 60 MIN: CPT

## 2020-01-01 PROCEDURE — 71045 X-RAY EXAM CHEST 1 VIEW: CPT

## 2020-01-01 PROCEDURE — 80053 COMPREHEN METABOLIC PANEL: CPT | Performed by: FAMILY MEDICINE

## 2020-01-01 PROCEDURE — 5A2204Z RESTORATION OF CARDIAC RHYTHM, SINGLE: ICD-10-PCS | Performed by: INTERNAL MEDICINE

## 2020-01-01 PROCEDURE — 83605 ASSAY OF LACTIC ACID: CPT | Performed by: EMERGENCY MEDICINE

## 2020-01-01 PROCEDURE — 94760 N-INVAS EAR/PLS OXIMETRY 1: CPT

## 2020-01-01 PROCEDURE — 99223 1ST HOSP IP/OBS HIGH 75: CPT | Performed by: NURSE PRACTITIONER

## 2020-01-01 PROCEDURE — 0T9B70Z DRAINAGE OF BLADDER WITH DRAINAGE DEVICE, VIA NATURAL OR ARTIFICIAL OPENING: ICD-10-PCS | Performed by: UROLOGY

## 2020-01-01 PROCEDURE — 83036 HEMOGLOBIN GLYCOSYLATED A1C: CPT | Performed by: FAMILY MEDICINE

## 2020-01-01 PROCEDURE — 99232 SBSQ HOSP IP/OBS MODERATE 35: CPT | Performed by: INTERNAL MEDICINE

## 2020-01-01 PROCEDURE — 71260 CT THORAX DX C+: CPT

## 2020-01-01 PROCEDURE — 82330 ASSAY OF CALCIUM: CPT | Performed by: INTERNAL MEDICINE

## 2020-01-01 PROCEDURE — 99447 NTRPROF PH1/NTRNET/EHR 11-20: CPT | Performed by: PSYCHIATRY & NEUROLOGY

## 2020-01-01 PROCEDURE — 80162 ASSAY OF DIGOXIN TOTAL: CPT | Performed by: PHYSICIAN ASSISTANT

## 2020-01-01 PROCEDURE — 80048 BASIC METABOLIC PNL TOTAL CA: CPT | Performed by: NURSE PRACTITIONER

## 2020-01-01 PROCEDURE — 84157 ASSAY OF PROTEIN OTHER: CPT | Performed by: INTERNAL MEDICINE

## 2020-01-01 PROCEDURE — 83970 ASSAY OF PARATHORMONE: CPT | Performed by: INTERNAL MEDICINE

## 2020-01-01 PROCEDURE — 93306 TTE W/DOPPLER COMPLETE: CPT

## 2020-01-01 PROCEDURE — 85027 COMPLETE CBC AUTOMATED: CPT | Performed by: EMERGENCY MEDICINE

## 2020-01-01 PROCEDURE — 99223 1ST HOSP IP/OBS HIGH 75: CPT | Performed by: INTERNAL MEDICINE

## 2020-01-01 PROCEDURE — 94640 AIRWAY INHALATION TREATMENT: CPT

## 2020-01-01 PROCEDURE — 96376 TX/PRO/DX INJ SAME DRUG ADON: CPT

## 2020-01-01 PROCEDURE — 81003 URINALYSIS AUTO W/O SCOPE: CPT | Performed by: EMERGENCY MEDICINE

## 2020-01-01 PROCEDURE — 94664 DEMO&/EVAL PT USE INHALER: CPT

## 2020-01-01 PROCEDURE — 36415 COLL VENOUS BLD VENIPUNCTURE: CPT

## 2020-01-01 PROCEDURE — 86803 HEPATITIS C AB TEST: CPT | Performed by: FAMILY MEDICINE

## 2020-01-01 PROCEDURE — 83880 ASSAY OF NATRIURETIC PEPTIDE: CPT | Performed by: EMERGENCY MEDICINE

## 2020-01-01 PROCEDURE — 78306 BONE IMAGING WHOLE BODY: CPT

## 2020-01-01 PROCEDURE — 70496 CT ANGIOGRAPHY HEAD: CPT

## 2020-01-01 PROCEDURE — 96366 THER/PROPH/DIAG IV INF ADDON: CPT

## 2020-01-01 PROCEDURE — 84145 PROCALCITONIN (PCT): CPT | Performed by: NURSE PRACTITIONER

## 2020-01-01 PROCEDURE — 83615 LACTATE (LD) (LDH) ENZYME: CPT | Performed by: INTERNAL MEDICINE

## 2020-01-01 PROCEDURE — 99239 HOSP IP/OBS DSCHRG MGMT >30: CPT | Performed by: INTERNAL MEDICINE

## 2020-01-01 PROCEDURE — 87070 CULTURE OTHR SPECIMN AEROBIC: CPT | Performed by: INTERNAL MEDICINE

## 2020-01-01 PROCEDURE — 92611 MOTION FLUOROSCOPY/SWALLOW: CPT

## 2020-01-01 PROCEDURE — 93312 ECHO TRANSESOPHAGEAL: CPT

## 2020-01-01 PROCEDURE — 92960 CARDIOVERSION ELECTRIC EXT: CPT

## 2020-01-01 PROCEDURE — A9503 TC99M MEDRONATE: HCPCS

## 2020-01-01 PROCEDURE — 99222 1ST HOSP IP/OBS MODERATE 55: CPT | Performed by: INTERNAL MEDICINE

## 2020-01-01 PROCEDURE — 84443 ASSAY THYROID STIM HORMONE: CPT | Performed by: FAMILY MEDICINE

## 2020-01-01 PROCEDURE — 92610 EVALUATE SWALLOWING FUNCTION: CPT

## 2020-01-01 PROCEDURE — 85025 COMPLETE CBC W/AUTO DIFF WBC: CPT | Performed by: EMERGENCY MEDICINE

## 2020-01-01 PROCEDURE — 93321 DOPPLER ECHO F-UP/LMTD STD: CPT | Performed by: INTERNAL MEDICINE

## 2020-01-01 PROCEDURE — 83735 ASSAY OF MAGNESIUM: CPT | Performed by: FAMILY MEDICINE

## 2020-01-01 PROCEDURE — 93308 TTE F-UP OR LMTD: CPT | Performed by: INTERNAL MEDICINE

## 2020-01-01 PROCEDURE — 0W993ZX DRAINAGE OF RIGHT PLEURAL CAVITY, PERCUTANEOUS APPROACH, DIAGNOSTIC: ICD-10-PCS | Performed by: STUDENT IN AN ORGANIZED HEALTH CARE EDUCATION/TRAINING PROGRAM

## 2020-01-01 PROCEDURE — 72100 X-RAY EXAM L-S SPINE 2/3 VWS: CPT

## 2020-01-01 PROCEDURE — 72158 MRI LUMBAR SPINE W/O & W/DYE: CPT

## 2020-01-01 PROCEDURE — 99291 CRITICAL CARE FIRST HOUR: CPT | Performed by: EMERGENCY MEDICINE

## 2020-01-01 PROCEDURE — 74018 RADEX ABDOMEN 1 VIEW: CPT

## 2020-01-01 PROCEDURE — 74230 X-RAY XM SWLNG FUNCJ C+: CPT

## 2020-01-01 PROCEDURE — 99285 EMERGENCY DEPT VISIT HI MDM: CPT | Performed by: EMERGENCY MEDICINE

## 2020-01-01 PROCEDURE — 97116 GAIT TRAINING THERAPY: CPT

## 2020-01-01 PROCEDURE — 99223 1ST HOSP IP/OBS HIGH 75: CPT

## 2020-01-01 PROCEDURE — 70450 CT HEAD/BRAIN W/O DYE: CPT

## 2020-01-01 PROCEDURE — A9585 GADOBUTROL INJECTION: HCPCS | Performed by: RADIOLOGY

## 2020-01-01 PROCEDURE — 84484 ASSAY OF TROPONIN QUANT: CPT | Performed by: NURSE PRACTITIONER

## 2020-01-01 PROCEDURE — 82140 ASSAY OF AMMONIA: CPT | Performed by: FAMILY MEDICINE

## 2020-01-01 PROCEDURE — 85025 COMPLETE CBC W/AUTO DIFF WBC: CPT | Performed by: NURSE PRACTITIONER

## 2020-01-01 PROCEDURE — 80048 BASIC METABOLIC PNL TOTAL CA: CPT | Performed by: INTERNAL MEDICINE

## 2020-01-01 PROCEDURE — 80320 DRUG SCREEN QUANTALCOHOLS: CPT | Performed by: EMERGENCY MEDICINE

## 2020-01-01 PROCEDURE — 96365 THER/PROPH/DIAG IV INF INIT: CPT

## 2020-01-01 PROCEDURE — 70498 CT ANGIOGRAPHY NECK: CPT

## 2020-01-01 PROCEDURE — 85007 BL SMEAR W/DIFF WBC COUNT: CPT | Performed by: EMERGENCY MEDICINE

## 2020-01-01 PROCEDURE — 83935 ASSAY OF URINE OSMOLALITY: CPT | Performed by: NURSE PRACTITIONER

## 2020-01-01 PROCEDURE — 85027 COMPLETE CBC AUTOMATED: CPT | Performed by: NURSE PRACTITIONER

## 2020-01-01 PROCEDURE — 71250 CT THORAX DX C-: CPT

## 2020-01-01 PROCEDURE — 80048 BASIC METABOLIC PNL TOTAL CA: CPT

## 2020-01-01 PROCEDURE — 84300 ASSAY OF URINE SODIUM: CPT | Performed by: NURSE PRACTITIONER

## 2020-01-01 PROCEDURE — 74176 CT ABD & PELVIS W/O CONTRAST: CPT

## 2020-01-01 PROCEDURE — 72157 MRI CHEST SPINE W/O & W/DYE: CPT

## 2020-01-01 PROCEDURE — 87635 SARS-COV-2 COVID-19 AMP PRB: CPT | Performed by: EMERGENCY MEDICINE

## 2020-01-01 PROCEDURE — 89051 BODY FLUID CELL COUNT: CPT | Performed by: INTERNAL MEDICINE

## 2020-01-01 PROCEDURE — 83986 ASSAY PH BODY FLUID NOS: CPT | Performed by: INTERNAL MEDICINE

## 2020-01-01 PROCEDURE — 82397 CHEMILUMINESCENT ASSAY: CPT | Performed by: INTERNAL MEDICINE

## 2020-01-01 RX ORDER — QUETIAPINE FUMARATE 100 MG/1
100 TABLET, FILM COATED ORAL
Status: DISCONTINUED | OUTPATIENT
Start: 2020-01-01 | End: 2020-01-01

## 2020-01-01 RX ORDER — NICOTINE 21 MG/24HR
21 PATCH, TRANSDERMAL 24 HOURS TRANSDERMAL DAILY
Status: DISCONTINUED | OUTPATIENT
Start: 2020-01-01 | End: 2020-01-01 | Stop reason: HOSPADM

## 2020-01-01 RX ORDER — METOPROLOL SUCCINATE 50 MG/1
50 TABLET, EXTENDED RELEASE ORAL DAILY
Qty: 30 TABLET | Refills: 0 | Status: SHIPPED | OUTPATIENT
Start: 2020-01-01 | End: 2020-01-01 | Stop reason: HOSPADM

## 2020-01-01 RX ORDER — IPRATROPIUM BROMIDE AND ALBUTEROL SULFATE 2.5; .5 MG/3ML; MG/3ML
3 SOLUTION RESPIRATORY (INHALATION) ONCE
Status: COMPLETED | OUTPATIENT
Start: 2020-01-01 | End: 2020-01-01

## 2020-01-01 RX ORDER — LORAZEPAM 2 MG/ML
0.5 INJECTION INTRAMUSCULAR
Status: DISCONTINUED | OUTPATIENT
Start: 2020-01-01 | End: 2020-01-01 | Stop reason: HOSPADM

## 2020-01-01 RX ORDER — ONDANSETRON 2 MG/ML
4 INJECTION INTRAMUSCULAR; INTRAVENOUS EVERY 6 HOURS PRN
Status: DISCONTINUED | OUTPATIENT
Start: 2020-01-01 | End: 2020-01-01 | Stop reason: HOSPADM

## 2020-01-01 RX ORDER — METOPROLOL TARTRATE 5 MG/5ML
5 INJECTION INTRAVENOUS EVERY 6 HOURS PRN
Status: DISCONTINUED | OUTPATIENT
Start: 2020-01-01 | End: 2020-01-01

## 2020-01-01 RX ORDER — ACETAMINOPHEN 325 MG/1
650 TABLET ORAL EVERY 6 HOURS PRN
Status: DISCONTINUED | OUTPATIENT
Start: 2020-01-01 | End: 2020-01-01 | Stop reason: HOSPADM

## 2020-01-01 RX ORDER — FUROSEMIDE 10 MG/ML
40 INJECTION INTRAMUSCULAR; INTRAVENOUS
Status: COMPLETED | OUTPATIENT
Start: 2020-01-01 | End: 2020-01-01

## 2020-01-01 RX ORDER — FUROSEMIDE 20 MG/1
20 TABLET ORAL DAILY
Qty: 30 TABLET | Refills: 0 | Status: SHIPPED | OUTPATIENT
Start: 2020-01-01 | End: 2020-01-01 | Stop reason: HOSPADM

## 2020-01-01 RX ORDER — METOPROLOL TARTRATE 5 MG/5ML
5 INJECTION INTRAVENOUS ONCE
Status: COMPLETED | OUTPATIENT
Start: 2020-01-01 | End: 2020-01-01

## 2020-01-01 RX ORDER — FUROSEMIDE 10 MG/ML
40 INJECTION INTRAMUSCULAR; INTRAVENOUS ONCE
Status: COMPLETED | OUTPATIENT
Start: 2020-01-01 | End: 2020-01-01

## 2020-01-01 RX ORDER — DIGOXIN 0.25 MG/ML
125 INJECTION INTRAMUSCULAR; INTRAVENOUS ONCE
Status: DISCONTINUED | OUTPATIENT
Start: 2020-01-01 | End: 2020-01-01

## 2020-01-01 RX ORDER — LIDOCAINE HYDROCHLORIDE 20 MG/ML
1 JELLY TOPICAL ONCE
Status: DISCONTINUED | OUTPATIENT
Start: 2020-01-01 | End: 2020-01-01

## 2020-01-01 RX ORDER — POTASSIUM CHLORIDE 29.8 MG/ML
40 INJECTION INTRAVENOUS ONCE
Status: DISCONTINUED | OUTPATIENT
Start: 2020-01-01 | End: 2020-01-01 | Stop reason: CLARIF

## 2020-01-01 RX ORDER — METOPROLOL SUCCINATE 50 MG/1
50 TABLET, EXTENDED RELEASE ORAL 2 TIMES DAILY
Status: DISCONTINUED | OUTPATIENT
Start: 2020-01-01 | End: 2020-01-01 | Stop reason: HOSPADM

## 2020-01-01 RX ORDER — TRAMADOL HYDROCHLORIDE 50 MG/1
50 TABLET ORAL EVERY 6 HOURS PRN
Status: DISCONTINUED | OUTPATIENT
Start: 2020-01-01 | End: 2020-01-01

## 2020-01-01 RX ORDER — METOPROLOL SUCCINATE 25 MG/1
25 TABLET, EXTENDED RELEASE ORAL ONCE
Status: COMPLETED | OUTPATIENT
Start: 2020-01-01 | End: 2020-01-01

## 2020-01-01 RX ORDER — LORAZEPAM 2 MG/ML
1 INJECTION INTRAMUSCULAR ONCE
Status: COMPLETED | OUTPATIENT
Start: 2020-01-01 | End: 2020-01-01

## 2020-01-01 RX ORDER — DIGOXIN 0.25 MG/ML
250 INJECTION INTRAMUSCULAR; INTRAVENOUS ONCE
Status: COMPLETED | OUTPATIENT
Start: 2020-01-01 | End: 2020-01-01

## 2020-01-01 RX ORDER — ALBUTEROL SULFATE 2.5 MG/3ML
2.5 SOLUTION RESPIRATORY (INHALATION) EVERY 6 HOURS PRN
Status: DISCONTINUED | OUTPATIENT
Start: 2020-01-01 | End: 2020-01-01

## 2020-01-01 RX ORDER — POTASSIUM CHLORIDE 14.9 MG/ML
20 INJECTION INTRAVENOUS ONCE
Status: COMPLETED | OUTPATIENT
Start: 2020-01-01 | End: 2020-01-01

## 2020-01-01 RX ORDER — LIDOCAINE HYDROCHLORIDE 10 MG/ML
5 INJECTION, SOLUTION EPIDURAL; INFILTRATION; INTRACAUDAL; PERINEURAL ONCE
Status: COMPLETED | OUTPATIENT
Start: 2020-01-01 | End: 2020-01-01

## 2020-01-01 RX ORDER — TAMSULOSIN HYDROCHLORIDE 0.4 MG/1
0.4 CAPSULE ORAL
Status: DISCONTINUED | OUTPATIENT
Start: 2020-01-01 | End: 2020-01-01

## 2020-01-01 RX ORDER — DIGOXIN 125 MCG
125 TABLET ORAL DAILY
Status: DISCONTINUED | OUTPATIENT
Start: 2020-01-01 | End: 2020-01-01

## 2020-01-01 RX ORDER — DIGOXIN 0.25 MG/ML
500 INJECTION INTRAMUSCULAR; INTRAVENOUS ONCE
Status: COMPLETED | OUTPATIENT
Start: 2020-01-01 | End: 2020-01-01

## 2020-01-01 RX ORDER — FUROSEMIDE 10 MG/ML
40 INJECTION INTRAMUSCULAR; INTRAVENOUS
Status: DISCONTINUED | OUTPATIENT
Start: 2020-01-01 | End: 2020-01-01

## 2020-01-01 RX ORDER — OXYCODONE HYDROCHLORIDE AND ACETAMINOPHEN 5; 325 MG/1; MG/1
1 TABLET ORAL EVERY 4 HOURS PRN
Status: DISCONTINUED | OUTPATIENT
Start: 2020-01-01 | End: 2020-01-01 | Stop reason: HOSPADM

## 2020-01-01 RX ORDER — OXYCODONE HYDROCHLORIDE 5 MG/1
5 TABLET ORAL EVERY 4 HOURS PRN
Status: DISCONTINUED | OUTPATIENT
Start: 2020-01-01 | End: 2020-01-01

## 2020-01-01 RX ORDER — CEFTRIAXONE 2 G/50ML
2000 INJECTION, SOLUTION INTRAVENOUS ONCE
Status: COMPLETED | OUTPATIENT
Start: 2020-01-01 | End: 2020-01-01

## 2020-01-01 RX ORDER — POTASSIUM CHLORIDE 14.9 MG/ML
20 INJECTION INTRAVENOUS
Status: COMPLETED | OUTPATIENT
Start: 2020-01-01 | End: 2020-01-01

## 2020-01-01 RX ORDER — CEFTRIAXONE 1 G/50ML
1000 INJECTION, SOLUTION INTRAVENOUS EVERY 24 HOURS
Status: DISCONTINUED | OUTPATIENT
Start: 2020-01-01 | End: 2020-01-01

## 2020-01-01 RX ORDER — METOPROLOL TARTRATE 5 MG/5ML
5 INJECTION INTRAVENOUS EVERY 6 HOURS PRN
Status: DISCONTINUED | OUTPATIENT
Start: 2020-01-01 | End: 2020-01-01 | Stop reason: HOSPADM

## 2020-01-01 RX ORDER — MAGNESIUM SULFATE HEPTAHYDRATE 40 MG/ML
2 INJECTION, SOLUTION INTRAVENOUS ONCE
Status: COMPLETED | OUTPATIENT
Start: 2020-01-01 | End: 2020-01-01

## 2020-01-01 RX ORDER — METOPROLOL SUCCINATE 50 MG/1
50 TABLET, EXTENDED RELEASE ORAL DAILY
Status: DISCONTINUED | OUTPATIENT
Start: 2020-01-01 | End: 2020-01-01

## 2020-01-01 RX ORDER — CALCITONIN SALMON 200 [IU]/.09ML
1 SPRAY, METERED NASAL DAILY
Status: DISCONTINUED | OUTPATIENT
Start: 2020-01-01 | End: 2020-01-01

## 2020-01-01 RX ORDER — FUROSEMIDE 40 MG/1
40 TABLET ORAL DAILY
Status: DISCONTINUED | OUTPATIENT
Start: 2020-01-01 | End: 2020-01-01 | Stop reason: HOSPADM

## 2020-01-01 RX ORDER — SODIUM CHLORIDE 9 MG/ML
125 INJECTION, SOLUTION INTRAVENOUS CONTINUOUS
Status: DISCONTINUED | OUTPATIENT
Start: 2020-01-01 | End: 2020-01-01

## 2020-01-01 RX ORDER — OLANZAPINE 10 MG/1
5 INJECTION, POWDER, LYOPHILIZED, FOR SOLUTION INTRAMUSCULAR ONCE
Status: COMPLETED | OUTPATIENT
Start: 2020-01-01 | End: 2020-01-01

## 2020-01-01 RX ORDER — ALBUTEROL SULFATE 90 UG/1
2 AEROSOL, METERED RESPIRATORY (INHALATION) EVERY 4 HOURS PRN
Status: DISCONTINUED | OUTPATIENT
Start: 2020-01-01 | End: 2020-01-01

## 2020-01-01 RX ORDER — FUROSEMIDE 10 MG/ML
60 INJECTION INTRAMUSCULAR; INTRAVENOUS ONCE
Status: COMPLETED | OUTPATIENT
Start: 2020-01-01 | End: 2020-01-01

## 2020-01-01 RX ORDER — AMOXICILLIN AND CLAVULANATE POTASSIUM 875; 125 MG/1; MG/1
1 TABLET, FILM COATED ORAL EVERY 12 HOURS SCHEDULED
Qty: 10 TABLET | Refills: 0 | Status: SHIPPED | OUTPATIENT
Start: 2020-01-01 | End: 2020-01-01

## 2020-01-01 RX ORDER — DOCUSATE SODIUM 100 MG/1
100 CAPSULE, LIQUID FILLED ORAL 2 TIMES DAILY
Status: DISCONTINUED | OUTPATIENT
Start: 2020-01-01 | End: 2020-01-01 | Stop reason: HOSPADM

## 2020-01-01 RX ORDER — DILTIAZEM HYDROCHLORIDE 5 MG/ML
20 INJECTION INTRAVENOUS ONCE
Status: COMPLETED | OUTPATIENT
Start: 2020-01-01 | End: 2020-01-01

## 2020-01-01 RX ORDER — GUAIFENESIN 600 MG
600 TABLET, EXTENDED RELEASE 12 HR ORAL 2 TIMES DAILY
Status: DISCONTINUED | OUTPATIENT
Start: 2020-01-01 | End: 2020-01-01 | Stop reason: HOSPADM

## 2020-01-01 RX ORDER — SODIUM CHLORIDE, SODIUM LACTATE, POTASSIUM CHLORIDE, CALCIUM CHLORIDE 600; 310; 30; 20 MG/100ML; MG/100ML; MG/100ML; MG/100ML
INJECTION, SOLUTION INTRAVENOUS CONTINUOUS PRN
Status: DISCONTINUED | OUTPATIENT
Start: 2020-01-01 | End: 2020-01-01

## 2020-01-01 RX ORDER — POTASSIUM CHLORIDE 750 MG/1
10 TABLET, EXTENDED RELEASE ORAL 2 TIMES DAILY
Qty: 30 TABLET | Refills: 0 | Status: SHIPPED | OUTPATIENT
Start: 2020-01-01 | End: 2020-01-01 | Stop reason: HOSPADM

## 2020-01-01 RX ORDER — AMLODIPINE BESYLATE 5 MG/1
5 TABLET ORAL DAILY
Status: DISCONTINUED | OUTPATIENT
Start: 2020-01-01 | End: 2020-01-01

## 2020-01-01 RX ORDER — DILTIAZEM HYDROCHLORIDE 5 MG/ML
30 INJECTION INTRAVENOUS ONCE
Status: COMPLETED | OUTPATIENT
Start: 2020-01-01 | End: 2020-01-01

## 2020-01-01 RX ORDER — LIDOCAINE HYDROCHLORIDE 10 MG/ML
INJECTION, SOLUTION EPIDURAL; INFILTRATION; INTRACAUDAL; PERINEURAL AS NEEDED
Status: DISCONTINUED | OUTPATIENT
Start: 2020-01-01 | End: 2020-01-01

## 2020-01-01 RX ORDER — OXYCODONE HYDROCHLORIDE 5 MG/1
5 CAPSULE ORAL EVERY 6 HOURS PRN
COMMUNITY
End: 2020-01-01 | Stop reason: HOSPADM

## 2020-01-01 RX ORDER — NICOTINE 21 MG/24HR
1 PATCH, TRANSDERMAL 24 HOURS TRANSDERMAL DAILY
Status: DISCONTINUED | OUTPATIENT
Start: 2020-01-01 | End: 2020-01-01

## 2020-01-01 RX ORDER — PROPOFOL 10 MG/ML
INJECTION, EMULSION INTRAVENOUS AS NEEDED
Status: DISCONTINUED | OUTPATIENT
Start: 2020-01-01 | End: 2020-01-01

## 2020-01-01 RX ORDER — FUROSEMIDE 10 MG/ML
40 INJECTION INTRAMUSCULAR; INTRAVENOUS DAILY
Status: DISCONTINUED | OUTPATIENT
Start: 2020-01-01 | End: 2020-01-01

## 2020-01-01 RX ORDER — ATORVASTATIN CALCIUM 40 MG/1
40 TABLET, FILM COATED ORAL
Status: DISCONTINUED | OUTPATIENT
Start: 2020-01-01 | End: 2020-01-01

## 2020-01-01 RX ADMIN — MORPHINE SULFATE 2 MG: 2 INJECTION, SOLUTION INTRAMUSCULAR; INTRAVENOUS at 08:16

## 2020-01-01 RX ADMIN — SODIUM CHLORIDE, SODIUM LACTATE, POTASSIUM CHLORIDE, AND CALCIUM CHLORIDE: .6; .31; .03; .02 INJECTION, SOLUTION INTRAVENOUS at 08:19

## 2020-01-01 RX ADMIN — ALBUTEROL SULFATE 2.5 MG: 2.5 SOLUTION RESPIRATORY (INHALATION) at 17:41

## 2020-01-01 RX ADMIN — FUROSEMIDE 40 MG: 10 INJECTION, SOLUTION INTRAMUSCULAR; INTRAVENOUS at 08:00

## 2020-01-01 RX ADMIN — OLANZAPINE 5 MG: 10 INJECTION, POWDER, FOR SOLUTION INTRAMUSCULAR at 03:15

## 2020-01-01 RX ADMIN — PROPOFOL 30 MG: 10 INJECTION, EMULSION INTRAVENOUS at 08:22

## 2020-01-01 RX ADMIN — MORPHINE SULFATE 2 MG: 2 INJECTION, SOLUTION INTRAMUSCULAR; INTRAVENOUS at 05:36

## 2020-01-01 RX ADMIN — CEFTRIAXONE 1000 MG: 1 INJECTION, SOLUTION INTRAVENOUS at 20:27

## 2020-01-01 RX ADMIN — APIXABAN 5 MG: 5 TABLET, FILM COATED ORAL at 17:37

## 2020-01-01 RX ADMIN — QUETIAPINE FUMARATE 100 MG: 100 TABLET ORAL at 21:26

## 2020-01-01 RX ADMIN — SODIUM CHLORIDE 1000 ML: 0.9 INJECTION, SOLUTION INTRAVENOUS at 17:00

## 2020-01-01 RX ADMIN — GUAIFENESIN 600 MG: 600 TABLET ORAL at 17:15

## 2020-01-01 RX ADMIN — Medication 400 MG: at 08:00

## 2020-01-01 RX ADMIN — DOCUSATE SODIUM 100 MG: 100 CAPSULE, LIQUID FILLED ORAL at 09:55

## 2020-01-01 RX ADMIN — METOPROLOL SUCCINATE 50 MG: 50 TABLET, EXTENDED RELEASE ORAL at 08:13

## 2020-01-01 RX ADMIN — ENOXAPARIN SODIUM 60 MG: 60 INJECTION SUBCUTANEOUS at 20:56

## 2020-01-01 RX ADMIN — APIXABAN 5 MG: 5 TABLET, FILM COATED ORAL at 17:49

## 2020-01-01 RX ADMIN — METOPROLOL SUCCINATE 50 MG: 50 TABLET, EXTENDED RELEASE ORAL at 21:45

## 2020-01-01 RX ADMIN — DIGOXIN 250 MCG: 0.25 INJECTION INTRAMUSCULAR; INTRAVENOUS at 20:18

## 2020-01-01 RX ADMIN — APIXABAN 5 MG: 5 TABLET, FILM COATED ORAL at 17:15

## 2020-01-01 RX ADMIN — FUROSEMIDE 40 MG: 10 INJECTION, SOLUTION INTRAMUSCULAR; INTRAVENOUS at 15:58

## 2020-01-01 RX ADMIN — Medication 400 MG: at 17:38

## 2020-01-01 RX ADMIN — TRAMADOL HYDROCHLORIDE 50 MG: 50 TABLET, FILM COATED ORAL at 20:56

## 2020-01-01 RX ADMIN — GUAIFENESIN 600 MG: 600 TABLET ORAL at 09:07

## 2020-01-01 RX ADMIN — FUROSEMIDE 40 MG: 10 INJECTION, SOLUTION INTRAMUSCULAR; INTRAVENOUS at 07:52

## 2020-01-01 RX ADMIN — APIXABAN 5 MG: 5 TABLET, FILM COATED ORAL at 17:46

## 2020-01-01 RX ADMIN — METRONIDAZOLE 500 MG: 500 INJECTION, SOLUTION INTRAVENOUS at 21:26

## 2020-01-01 RX ADMIN — METOPROLOL TARTRATE 5 MG: 5 INJECTION INTRAVENOUS at 17:01

## 2020-01-01 RX ADMIN — TAMSULOSIN HYDROCHLORIDE 0.4 MG: 0.4 CAPSULE ORAL at 16:25

## 2020-01-01 RX ADMIN — METOPROLOL SUCCINATE 25 MG: 25 TABLET, EXTENDED RELEASE ORAL at 10:26

## 2020-01-01 RX ADMIN — POTASSIUM CHLORIDE 20 MEQ: 14.9 INJECTION, SOLUTION INTRAVENOUS at 08:45

## 2020-01-01 RX ADMIN — METOPROLOL SUCCINATE 75 MG: 50 TABLET, EXTENDED RELEASE ORAL at 09:30

## 2020-01-01 RX ADMIN — DILTIAZEM HYDROCHLORIDE 30 MG: 30 TABLET, FILM COATED ORAL at 23:39

## 2020-01-01 RX ADMIN — ENOXAPARIN SODIUM 60 MG: 60 INJECTION SUBCUTANEOUS at 07:56

## 2020-01-01 RX ADMIN — Medication 400 MG: at 17:45

## 2020-01-01 RX ADMIN — Medication 400 MG: at 17:15

## 2020-01-01 RX ADMIN — METRONIDAZOLE 500 MG: 500 INJECTION, SOLUTION INTRAVENOUS at 14:00

## 2020-01-01 RX ADMIN — PAMIDRONATE DISODIUM 60 MG: 3 INJECTION INTRAVENOUS at 13:50

## 2020-01-01 RX ADMIN — ACETAMINOPHEN 650 MG: 325 TABLET ORAL at 13:20

## 2020-01-01 RX ADMIN — LORAZEPAM 0.5 MG: 2 INJECTION INTRAMUSCULAR; INTRAVENOUS at 13:32

## 2020-01-01 RX ADMIN — LORAZEPAM 0.5 MG: 2 INJECTION INTRAMUSCULAR; INTRAVENOUS at 18:30

## 2020-01-01 RX ADMIN — DIGOXIN 250 MCG: 0.25 INJECTION INTRAMUSCULAR; INTRAVENOUS at 15:55

## 2020-01-01 RX ADMIN — METOPROLOL SUCCINATE 50 MG: 50 TABLET, EXTENDED RELEASE ORAL at 09:17

## 2020-01-01 RX ADMIN — DOCUSATE SODIUM 100 MG: 100 CAPSULE, LIQUID FILLED ORAL at 08:16

## 2020-01-01 RX ADMIN — FUROSEMIDE 40 MG: 10 INJECTION, SOLUTION INTRAMUSCULAR; INTRAVENOUS at 08:32

## 2020-01-01 RX ADMIN — METOPROLOL SUCCINATE 50 MG: 50 TABLET, EXTENDED RELEASE ORAL at 21:42

## 2020-01-01 RX ADMIN — PROPOFOL 30 MG: 10 INJECTION, EMULSION INTRAVENOUS at 08:24

## 2020-01-01 RX ADMIN — LORAZEPAM 0.5 MG: 2 INJECTION INTRAMUSCULAR; INTRAVENOUS at 14:34

## 2020-01-01 RX ADMIN — MORPHINE SULFATE 2 MG: 2 INJECTION, SOLUTION INTRAMUSCULAR; INTRAVENOUS at 06:36

## 2020-01-01 RX ADMIN — PIPERACILLIN SODIUM AND TAZOBACTAM SODIUM 3.38 G: 36; 4.5 INJECTION, POWDER, FOR SOLUTION INTRAVENOUS at 15:15

## 2020-01-01 RX ADMIN — APIXABAN 5 MG: 5 TABLET, FILM COATED ORAL at 18:58

## 2020-01-01 RX ADMIN — METOROPROLOL TARTRATE 5 MG: 5 INJECTION, SOLUTION INTRAVENOUS at 16:37

## 2020-01-01 RX ADMIN — GUAIFENESIN 600 MG: 600 TABLET ORAL at 08:45

## 2020-01-01 RX ADMIN — SODIUM CHLORIDE 125 ML/HR: 0.9 INJECTION, SOLUTION INTRAVENOUS at 14:49

## 2020-01-01 RX ADMIN — FUROSEMIDE 40 MG: 10 INJECTION, SOLUTION INTRAMUSCULAR; INTRAVENOUS at 10:44

## 2020-01-01 RX ADMIN — AMLODIPINE BESYLATE 5 MG: 5 TABLET ORAL at 08:29

## 2020-01-01 RX ADMIN — APIXABAN 5 MG: 5 TABLET, FILM COATED ORAL at 07:51

## 2020-01-01 RX ADMIN — QUETIAPINE FUMARATE 100 MG: 100 TABLET ORAL at 20:26

## 2020-01-01 RX ADMIN — ATORVASTATIN CALCIUM 40 MG: 40 TABLET, FILM COATED ORAL at 16:25

## 2020-01-01 RX ADMIN — ATORVASTATIN CALCIUM 40 MG: 40 TABLET, FILM COATED ORAL at 17:59

## 2020-01-01 RX ADMIN — SODIUM CHLORIDE 1000 ML: 0.9 INJECTION, SOLUTION INTRAVENOUS at 16:25

## 2020-01-01 RX ADMIN — GADOBUTROL 6 ML: 604.72 INJECTION INTRAVENOUS at 10:36

## 2020-01-01 RX ADMIN — DILTIAZEM HYDROCHLORIDE 30 MG: 30 TABLET, FILM COATED ORAL at 05:30

## 2020-01-01 RX ADMIN — CEFTRIAXONE 1000 MG: 1 INJECTION, SOLUTION INTRAVENOUS at 20:32

## 2020-01-01 RX ADMIN — METOPROLOL SUCCINATE 50 MG: 50 TABLET, EXTENDED RELEASE ORAL at 08:16

## 2020-01-01 RX ADMIN — DILTIAZEM HYDROCHLORIDE 20 MG: 5 INJECTION INTRAVENOUS at 14:49

## 2020-01-01 RX ADMIN — ATORVASTATIN CALCIUM 40 MG: 40 TABLET, FILM COATED ORAL at 17:00

## 2020-01-01 RX ADMIN — Medication 400 MG: at 08:16

## 2020-01-01 RX ADMIN — PIPERACILLIN SODIUM AND TAZOBACTAM SODIUM 3.38 G: 36; 4.5 INJECTION, POWDER, FOR SOLUTION INTRAVENOUS at 21:44

## 2020-01-01 RX ADMIN — GUAIFENESIN 600 MG: 600 TABLET ORAL at 09:55

## 2020-01-01 RX ADMIN — IOHEXOL 100 ML: 350 INJECTION, SOLUTION INTRAVENOUS at 18:22

## 2020-01-01 RX ADMIN — ALBUTEROL SULFATE 2.5 MG: 2.5 SOLUTION RESPIRATORY (INHALATION) at 01:47

## 2020-01-01 RX ADMIN — MORPHINE SULFATE 2 MG: 2 INJECTION, SOLUTION INTRAMUSCULAR; INTRAVENOUS at 18:30

## 2020-01-01 RX ADMIN — DILTIAZEM HYDROCHLORIDE 5 MG/HR: 5 INJECTION INTRAVENOUS at 15:00

## 2020-01-01 RX ADMIN — QUETIAPINE FUMARATE 100 MG: 100 TABLET ORAL at 21:38

## 2020-01-01 RX ADMIN — METOROPROLOL TARTRATE 5 MG: 5 INJECTION, SOLUTION INTRAVENOUS at 22:22

## 2020-01-01 RX ADMIN — CALCITONIN SALMON 1 SPRAY: 200 SPRAY, METERED NASAL at 10:27

## 2020-01-01 RX ADMIN — Medication 400 MG: at 19:38

## 2020-01-01 RX ADMIN — METRONIDAZOLE 500 MG: 500 INJECTION, SOLUTION INTRAVENOUS at 03:48

## 2020-01-01 RX ADMIN — DOCUSATE SODIUM 100 MG: 100 CAPSULE, LIQUID FILLED ORAL at 17:46

## 2020-01-01 RX ADMIN — METOROPROLOL TARTRATE 5 MG: 5 INJECTION, SOLUTION INTRAVENOUS at 05:30

## 2020-01-01 RX ADMIN — METOPROLOL TARTRATE 25 MG: 25 TABLET, FILM COATED ORAL at 09:08

## 2020-01-01 RX ADMIN — IOHEXOL 85 ML: 350 INJECTION, SOLUTION INTRAVENOUS at 13:39

## 2020-01-01 RX ADMIN — PIPERACILLIN SODIUM AND TAZOBACTAM SODIUM 3.38 G: 36; 4.5 INJECTION, POWDER, FOR SOLUTION INTRAVENOUS at 04:25

## 2020-01-01 RX ADMIN — APIXABAN 5 MG: 5 TABLET, FILM COATED ORAL at 08:29

## 2020-01-01 RX ADMIN — TAMSULOSIN HYDROCHLORIDE 0.4 MG: 0.4 CAPSULE ORAL at 18:39

## 2020-01-01 RX ADMIN — PROPOFOL 30 MG: 10 INJECTION, EMULSION INTRAVENOUS at 08:31

## 2020-01-01 RX ADMIN — PIPERACILLIN SODIUM AND TAZOBACTAM SODIUM 3.38 G: 36; 4.5 INJECTION, POWDER, FOR SOLUTION INTRAVENOUS at 11:01

## 2020-01-01 RX ADMIN — GUAIFENESIN 600 MG: 600 TABLET ORAL at 19:38

## 2020-01-01 RX ADMIN — METOPROLOL TARTRATE 5 MG: 5 INJECTION INTRAVENOUS at 07:29

## 2020-01-01 RX ADMIN — FUROSEMIDE 40 MG: 10 INJECTION, SOLUTION INTRAMUSCULAR; INTRAVENOUS at 09:30

## 2020-01-01 RX ADMIN — MORPHINE SULFATE 2 MG: 2 INJECTION, SOLUTION INTRAMUSCULAR; INTRAVENOUS at 03:33

## 2020-01-01 RX ADMIN — DILTIAZEM HYDROCHLORIDE 30 MG: 30 TABLET, FILM COATED ORAL at 10:50

## 2020-01-01 RX ADMIN — FUROSEMIDE 40 MG: 10 INJECTION, SOLUTION INTRAMUSCULAR; INTRAVENOUS at 18:08

## 2020-01-01 RX ADMIN — CEFTRIAXONE 2000 MG: 2 INJECTION, SOLUTION INTRAVENOUS at 16:29

## 2020-01-01 RX ADMIN — Medication 400 MG: at 08:45

## 2020-01-01 RX ADMIN — APIXABAN 5 MG: 5 TABLET, FILM COATED ORAL at 09:17

## 2020-01-01 RX ADMIN — DILTIAZEM HYDROCHLORIDE 30 MG: 5 INJECTION INTRAVENOUS at 16:31

## 2020-01-01 RX ADMIN — ENOXAPARIN SODIUM 60 MG: 60 INJECTION SUBCUTANEOUS at 09:30

## 2020-01-01 RX ADMIN — METOPROLOL SUCCINATE 50 MG: 50 TABLET, EXTENDED RELEASE ORAL at 07:51

## 2020-01-01 RX ADMIN — LIDOCAINE HYDROCHLORIDE 100 MG: 10 INJECTION, SOLUTION EPIDURAL; INFILTRATION; INTRACAUDAL; PERINEURAL at 08:19

## 2020-01-01 RX ADMIN — APIXABAN 5 MG: 5 TABLET, FILM COATED ORAL at 17:59

## 2020-01-01 RX ADMIN — FUROSEMIDE 40 MG: 10 INJECTION, SOLUTION INTRAMUSCULAR; INTRAVENOUS at 13:22

## 2020-01-01 RX ADMIN — ACETAMINOPHEN 650 MG: 325 TABLET ORAL at 07:49

## 2020-01-01 RX ADMIN — METOROPROLOL TARTRATE 5 MG: 5 INJECTION, SOLUTION INTRAVENOUS at 06:43

## 2020-01-01 RX ADMIN — SODIUM CHLORIDE 125 ML/HR: 0.9 INJECTION, SOLUTION INTRAVENOUS at 17:39

## 2020-01-01 RX ADMIN — ENOXAPARIN SODIUM 60 MG: 60 INJECTION SUBCUTANEOUS at 21:25

## 2020-01-01 RX ADMIN — LORAZEPAM 0.5 MG: 2 INJECTION INTRAMUSCULAR; INTRAVENOUS at 17:16

## 2020-01-01 RX ADMIN — ENOXAPARIN SODIUM 60 MG: 60 INJECTION SUBCUTANEOUS at 20:27

## 2020-01-01 RX ADMIN — METOPROLOL TARTRATE 5 MG: 5 INJECTION INTRAVENOUS at 12:33

## 2020-01-01 RX ADMIN — METRONIDAZOLE 500 MG: 500 INJECTION, SOLUTION INTRAVENOUS at 20:27

## 2020-01-01 RX ADMIN — GUAIFENESIN 600 MG: 600 TABLET ORAL at 17:38

## 2020-01-01 RX ADMIN — AZITHROMYCIN 500 MG: 500 INJECTION, POWDER, LYOPHILIZED, FOR SOLUTION INTRAVENOUS at 17:06

## 2020-01-01 RX ADMIN — APIXABAN 5 MG: 5 TABLET, FILM COATED ORAL at 17:09

## 2020-01-01 RX ADMIN — SODIUM CHLORIDE 125 ML/HR: 0.9 INJECTION, SOLUTION INTRAVENOUS at 01:17

## 2020-01-01 RX ADMIN — CEFTRIAXONE 1000 MG: 1 INJECTION, SOLUTION INTRAVENOUS at 15:55

## 2020-01-01 RX ADMIN — GUAIFENESIN 600 MG: 600 TABLET ORAL at 17:45

## 2020-01-01 RX ADMIN — METOROPROLOL TARTRATE 5 MG: 5 INJECTION, SOLUTION INTRAVENOUS at 19:37

## 2020-01-01 RX ADMIN — MORPHINE SULFATE 2 MG: 2 INJECTION, SOLUTION INTRAMUSCULAR; INTRAVENOUS at 07:47

## 2020-01-01 RX ADMIN — DIGOXIN 500 MCG: 0.25 INJECTION INTRAMUSCULAR; INTRAVENOUS at 12:43

## 2020-01-01 RX ADMIN — LORAZEPAM 1 MG: 2 INJECTION INTRAMUSCULAR; INTRAVENOUS at 06:37

## 2020-01-01 RX ADMIN — IOHEXOL 100 ML: 350 INJECTION, SOLUTION INTRAVENOUS at 11:45

## 2020-01-01 RX ADMIN — DILTIAZEM HYDROCHLORIDE 15 MG/HR: 5 INJECTION INTRAVENOUS at 22:40

## 2020-01-01 RX ADMIN — METOPROLOL TARTRATE 25 MG: 25 TABLET, FILM COATED ORAL at 22:22

## 2020-01-01 RX ADMIN — PROPOFOL 40 MG: 10 INJECTION, EMULSION INTRAVENOUS at 08:29

## 2020-01-01 RX ADMIN — ALBUTEROL SULFATE 2 PUFF: 90 AEROSOL, METERED RESPIRATORY (INHALATION) at 16:37

## 2020-01-01 RX ADMIN — FUROSEMIDE 40 MG: 10 INJECTION, SOLUTION INTRAMUSCULAR; INTRAVENOUS at 09:55

## 2020-01-01 RX ADMIN — LORAZEPAM 1 MG: 2 INJECTION INTRAMUSCULAR; INTRAVENOUS at 12:27

## 2020-01-01 RX ADMIN — IPRATROPIUM BROMIDE AND ALBUTEROL SULFATE 3 ML: 2.5; .5 SOLUTION RESPIRATORY (INHALATION) at 20:47

## 2020-01-01 RX ADMIN — METRONIDAZOLE 500 MG: 500 INJECTION, SOLUTION INTRAVENOUS at 12:09

## 2020-01-01 RX ADMIN — TRAMADOL HYDROCHLORIDE 50 MG: 50 TABLET, FILM COATED ORAL at 08:52

## 2020-01-01 RX ADMIN — APIXABAN 5 MG: 5 TABLET, FILM COATED ORAL at 09:07

## 2020-01-01 RX ADMIN — APIXABAN 5 MG: 5 TABLET, FILM COATED ORAL at 08:13

## 2020-01-01 RX ADMIN — LIDOCAINE HYDROCHLORIDE 5 ML: 10 INJECTION, SOLUTION EPIDURAL; INFILTRATION; INTRACAUDAL; PERINEURAL at 10:45

## 2020-01-01 RX ADMIN — SODIUM CHLORIDE 500 ML: 0.9 INJECTION, SOLUTION INTRAVENOUS at 10:51

## 2020-01-01 RX ADMIN — MORPHINE SULFATE 2 MG: 2 INJECTION, SOLUTION INTRAMUSCULAR; INTRAVENOUS at 13:32

## 2020-01-01 RX ADMIN — MORPHINE SULFATE 2 MG: 2 INJECTION, SOLUTION INTRAMUSCULAR; INTRAVENOUS at 04:11

## 2020-01-01 RX ADMIN — APIXABAN 5 MG: 5 TABLET, FILM COATED ORAL at 08:00

## 2020-01-01 RX ADMIN — DILTIAZEM HYDROCHLORIDE 30 MG: 30 TABLET, FILM COATED ORAL at 17:15

## 2020-01-01 RX ADMIN — METRONIDAZOLE 500 MG: 500 INJECTION, SOLUTION INTRAVENOUS at 04:50

## 2020-01-01 RX ADMIN — Medication 400 MG: at 09:08

## 2020-01-01 RX ADMIN — CEFTRIAXONE 1000 MG: 1 INJECTION, SOLUTION INTRAVENOUS at 16:38

## 2020-01-01 RX ADMIN — AMLODIPINE BESYLATE 5 MG: 5 TABLET ORAL at 09:31

## 2020-01-01 RX ADMIN — METOPROLOL SUCCINATE 50 MG: 50 TABLET, EXTENDED RELEASE ORAL at 11:57

## 2020-01-01 RX ADMIN — FUROSEMIDE 60 MG: 10 INJECTION, SOLUTION INTRAMUSCULAR; INTRAVENOUS at 21:51

## 2020-01-01 RX ADMIN — NICOTINE POLACRILEX 2 MG: 2 GUM, CHEWING BUCCAL at 09:46

## 2020-01-01 RX ADMIN — CALCITONIN SALMON 1 SPRAY: 200 SPRAY, METERED NASAL at 13:50

## 2020-01-01 RX ADMIN — METRONIDAZOLE 500 MG: 500 INJECTION, SOLUTION INTRAVENOUS at 21:49

## 2020-01-01 RX ADMIN — GUAIFENESIN 600 MG: 600 TABLET ORAL at 08:16

## 2020-01-01 RX ADMIN — POTASSIUM CHLORIDE 20 MEQ: 14.9 INJECTION, SOLUTION INTRAVENOUS at 19:38

## 2020-01-01 RX ADMIN — LORAZEPAM 0.5 MG: 2 INJECTION INTRAMUSCULAR; INTRAVENOUS at 04:31

## 2020-01-01 RX ADMIN — PROPOFOL 100 MG: 10 INJECTION, EMULSION INTRAVENOUS at 08:19

## 2020-01-01 RX ADMIN — APIXABAN 5 MG: 5 TABLET, FILM COATED ORAL at 08:45

## 2020-01-01 RX ADMIN — MORPHINE SULFATE 2 MG: 2 INJECTION, SOLUTION INTRAMUSCULAR; INTRAVENOUS at 02:49

## 2020-01-01 RX ADMIN — DILTIAZEM HYDROCHLORIDE 15 MG/HR: 5 INJECTION INTRAVENOUS at 08:27

## 2020-01-01 RX ADMIN — CEFTRIAXONE 1000 MG: 1 INJECTION, SOLUTION INTRAVENOUS at 20:56

## 2020-01-01 RX ADMIN — ATORVASTATIN CALCIUM 40 MG: 40 TABLET, FILM COATED ORAL at 17:03

## 2020-01-01 RX ADMIN — MAGNESIUM SULFATE HEPTAHYDRATE 2 G: 40 INJECTION, SOLUTION INTRAVENOUS at 18:15

## 2020-01-01 RX ADMIN — POTASSIUM CHLORIDE 20 MEQ: 14.9 INJECTION, SOLUTION INTRAVENOUS at 10:51

## 2020-01-01 RX ADMIN — METOPROLOL SUCCINATE 50 MG: 50 TABLET, EXTENDED RELEASE ORAL at 08:00

## 2020-01-01 RX ADMIN — APIXABAN 5 MG: 5 TABLET, FILM COATED ORAL at 08:16

## 2020-01-01 RX ADMIN — FUROSEMIDE 40 MG: 10 INJECTION, SOLUTION INTRAMUSCULAR; INTRAVENOUS at 15:16

## 2020-01-01 RX ADMIN — METOPROLOL SUCCINATE 50 MG: 50 TABLET, EXTENDED RELEASE ORAL at 08:29

## 2020-08-22 PROBLEM — Z72.0 TOBACCO ABUSE: Status: ACTIVE | Noted: 2020-01-01

## 2020-08-22 PROBLEM — R79.89 ELEVATED SERUM CREATININE: Status: ACTIVE | Noted: 2020-01-01

## 2020-08-22 PROBLEM — A41.9 SEPSIS DUE TO PNEUMONIA (HCC): Status: ACTIVE | Noted: 2020-01-01

## 2020-08-22 PROBLEM — I48.91 NEW ONSET A-FIB (HCC): Status: ACTIVE | Noted: 2020-01-01

## 2020-08-22 PROBLEM — E87.8 ELECTROLYTE ABNORMALITY: Status: ACTIVE | Noted: 2020-01-01

## 2020-08-22 PROBLEM — J18.9 SEPSIS DUE TO PNEUMONIA (HCC): Status: ACTIVE | Noted: 2020-01-01

## 2020-08-22 PROBLEM — J18.9 PNEUMONIA OF RIGHT LOWER LOBE DUE TO INFECTIOUS ORGANISM: Status: ACTIVE | Noted: 2020-01-01

## 2020-08-22 NOTE — ASSESSMENT & PLAN NOTE
Serum creatinine level is 1 58-could be secondary to sepsis  Unable to compare the baseline  Status post 2 L of bolus  Avoid nephrotoxic medication  Monitor labs

## 2020-08-22 NOTE — H&P
H&P- Harsha Cohen 1955, 76 y o  male MRN: [id number]    Unit/Bed#: -01 Encounter: [id number]    Primary Care Provider: No primary care provider on file     Date and time admitted to hospital: 8/22/2020  2:27 PM        New onset a-fib [facility])  Assessment & Plan  Patient is having sepsis secondary to pneumonia-could be the cause  Patient received Cardizem 20 mg, then Cardizem 30 mg, then Lopressor 5 mg IV-then patient received Cardizem drip in the ER  Heart rate is trending down  Continue Cardizem drip target heart rate is less than 100, continue Eliquis  Follow echocardiogram  Follow cardiology consult  Check TSH      * Sepsis due to pneumonia [facility])  Assessment & Plan  Upon arrival patient is tachycardic, WBC is 16 13 chest x-ray shows right lower lobe pneumonia  Status post 2 L of bolus  Patient received azithromycin and ceftriaxone in the ER-will continue  Follow respiratory protocol  Follow-up blood culture urine for Legionella, pneumonia antigen, follow procalcitonin  Maintain oxygen saturation more than 90%    Elevated serum creatinine  Assessment & Plan  Serum creatinine level is 1 58-could be secondary to sepsis  Unable to compare the baseline  Status post 2 L of bolus  Avoid nephrotoxic medication  Monitor labs    Electrolyte abnormality  Assessment & Plan  Unknown etiology  Sodium level is 127, potassium is 3 2  Patient received 2 L of bolus, will supplement potassium  Recheck electrolytes    Pneumonia of right lower lobe due to infectious organism  Assessment & Plan  Unknown etiology  Patient received azithromycin and ceftriaxone in the ER-will continue  Follow blood culture, urine for Legionella, pneumonia, procalcitonin  Lactic acid is level is normal  COVID negative  Follow respiratory protocol    Tobacco abuse  Assessment & Plan  Continue Nicoderm patch    VTE Prophylaxis: Apixaban (Eliquis)  / sequential compression device   Code Status:  Full code    Discussion with family: with patient    Anticipated Length of Stay:  Patient will be admitted on an Inpatient basis with an anticipated length of stay of  > 2 midnights  Justification for Hospital Stay:  To monitor above conditions    Total Time for Visit, including Counseling / Coordination of Care: 45 minutes  Greater than 50% of this total time spent on direct patient counseling and coordination of care  Chief Complaint:   Back pain and sweating    History of Present Illness:    Marli iFore is a 76 y o  male who presents with back pain in sweating  Patient reports he was having back pain for last few weeks, which is on and, since yesterday is getting worse and he came to the hospital   In the emergency room, patient was found AFib with RVR, as well as as per chest x-ray patient has pneumonia  Patient denies any chest pain, cough, short of breath, nausea, vomiting, diarrhea, constipation, recent traveling history  Patient does smoke 1 pack per day since age 31  Does drink alcohol occasionally  Review of Systems:    Review of Systems   Constitutional: Positive for activity change  Negative for appetite change, chills, diaphoresis, fatigue, fever and unexpected weight change  HENT: Negative for congestion, sore throat, tinnitus, trouble swallowing and voice change  Eyes: Negative for photophobia and visual disturbance  Respiratory: Negative for apnea, cough, chest tightness, shortness of breath, wheezing and stridor  Cardiovascular: Positive for palpitations  Negative for chest pain and leg swelling  Gastrointestinal: Negative for abdominal distention, abdominal pain, anal bleeding, blood in stool, constipation, diarrhea, nausea and rectal pain  Genitourinary: Negative for difficulty urinating, dysuria, enuresis, flank pain and frequency  Musculoskeletal: Positive for back pain  Negative for gait problem, joint swelling and myalgias  Skin: Negative for color change, pallor and rash     Neurological: Negative for dizziness, seizures, syncope, facial asymmetry, speech difficulty, light-headedness, numbness and headaches  Psychiatric/Behavioral: Negative for agitation, behavioral problems and confusion  All other systems reviewed and are negative  Past Medical and Surgical History:     Past Medical History:   Diagnosis Date    Hypertension        Past Surgical History:   Procedure Laterality Date    TONSILLECTOMY         Meds/Allergies:    Prior to Admission medications    Not on File     I have reviewed home medications with patient personally  Allergies: No Known Allergies    Social History:     Marital Status: Single   Occupation:  Unknown  Patient Pre-hospital Living Situation:  At home  Patient Pre-hospital Level of Mobility:  Independent  Patient Pre-hospital Diet Restrictions:  No restrictions  Substance Use History:   Social History     Substance and Sexual Activity   Alcohol Use Yes    Frequency: 2-3 times a week    Drinks per session: 1 or 2    Binge frequency: Monthly     Social History     Tobacco Use   Smoking Status Current Every Day Smoker    Packs/day: 1 50    Types: Cigarettes   Smokeless Tobacco Never Used     Social History     Substance and Sexual Activity   Drug Use Never       Family History:    non-contributory    Physical Exam:     Vitals:   Blood Pressure: 106/63 (08/22/20 1825)  Pulse: (!) 126 (08/22/20 1825)  Temperature: (!) 95 6 °F (35 3 °C) (08/22/20 1432)  Temp Source: Oral (08/22/20 1702)  Respirations: 22 (08/22/20 1825)  Height: 5' 6" (167 6 cm) (08/22/20 1825)  Weight - Scale: 70 3 kg (154 lb 15 7 oz) (08/22/20 1825)  SpO2: 97 % (08/22/20 1825)    Physical Exam  Vitals signs and nursing note reviewed  Exam conducted with a chaperone present  Constitutional:       Appearance: He is not diaphoretic  HENT:      Head: Normocephalic and atraumatic  Nose: No congestion  Mouth/Throat:      Pharynx: No oropharyngeal exudate     Eyes:      General: No scleral icterus  Extraocular Movements: Extraocular movements intact  Conjunctiva/sclera: Conjunctivae normal       Pupils: Pupils are equal, round, and reactive to light  Neck:      Musculoskeletal: Normal range of motion  Cardiovascular:      Rate and Rhythm: Tachycardia present  Rhythm irregular  Heart sounds: No murmur  No friction rub  No gallop  Pulmonary:      Effort: Pulmonary effort is normal  No respiratory distress  Breath sounds: No wheezing, rhonchi or rales  Chest:      Chest wall: No tenderness  Abdominal:      General: Abdomen is flat  Bowel sounds are normal  There is no distension  Palpations: Abdomen is soft  There is no mass  Tenderness: There is no abdominal tenderness  There is no guarding  Musculoskeletal: Normal range of motion  General: No swelling or tenderness  Skin:     General: Skin is warm  Capillary Refill: Capillary refill takes less than 2 seconds  Neurological:      General: No focal deficit present  Mental Status: He is alert and oriented to person, place, and time  Motor: No weakness  Gait: Gait normal    Psychiatric:         Mood and Affect: Mood normal            Additional Data:     Lab Results: I have personally reviewed pertinent reports        Results from last 7 days   Lab Units 08/22/20  1454   WBC Thousand/uL 16 13*   HEMOGLOBIN g/dL 16 1   HEMATOCRIT % 46 6   PLATELETS Thousands/uL 239   BANDS PCT % 1   LYMPHO PCT % 10*   MONO PCT % 6   EOS PCT % 0     Results from last 7 days   Lab Units 08/22/20  1454   SODIUM mmol/L 127*   POTASSIUM mmol/L 3 2*   CHLORIDE mmol/L 89*   CO2 mmol/L 29   BUN mg/dL 17   CREATININE mg/dL 1 58*   ANION GAP mmol/L 9   CALCIUM mg/dL 8 5   ALBUMIN g/dL 2 7*   TOTAL BILIRUBIN mg/dL 0 81   ALK PHOS U/L 202*   ALT U/L 45   AST U/L 54*   GLUCOSE RANDOM mg/dL 138                 Results from last 7 days   Lab Units 08/22/20  1603   LACTIC ACID mmol/L 1 5       Imaging: I have personally reviewed pertinent reports  XR chest 1 view portable   ED Interpretation by Osvaldo Yip DO (08/22 1542)   RLL infiltrate      Final Result by Renae Rojas MD (08/22 0584)      Patchy right lower lung zone airspace opacity suspicious for pneumonia  Workstation performed: [id number]             EKG, Pathology, and Other Studies Reviewed on Admission:   · EKG:  Atrial fibrillation with RVR  Allscripts / Epic Records Reviewed: Yes     ** Please Note: This note has been constructed using a voice recognition system   **

## 2020-08-22 NOTE — ED PROVIDER NOTES
History  Chief Complaint   Patient presents with    Chest Pain     Patient states has off and on right sided CP x 1 week  Denies N/V, SOB     Patient comes to the emergency room today upon the advice of his daughter for generalized weakness  Patient reports to me that all week he has been feeling generally weak and tired  It has been been getting progressively worse  He reports that today he was starting to get diaphoretic and his daughter was concerned about that and she finally urged him to come to the emergency room  Patient reports that he might feel some slight chest discomfort on the right side of his chest but is nonradiating  He denies any shortness of breath, nausea, or vomiting  Denies any recent fevers or chills  Denies other any systemic complaints  Patient reports to smoking daily  He also reports to drinking but denies that he drinks heavily  Reports only history of hypertension for which he does not take medication      History provided by:  Patient and relative  Fatigue   Severity:  Moderate  Onset quality:  Gradual  Duration:  7 days  Timing:  Intermittent  Progression:  Worsening  Chronicity:  New  Context: alcohol use    Context: not change in medication    Relieved by:  Nothing  Worsened by:   Activity  Ineffective treatments:  None tried  Associated symptoms: chest pain    Associated symptoms: no abdominal pain, no arthralgias, no cough, no diarrhea, no difficulty walking, no dizziness, no dysuria, no falls, no fever, no headaches, no lethargy, no loss of consciousness, no myalgias, no nausea, no near-syncope, no shortness of breath, no syncope and no vomiting    Chest pain:     Quality: aching      Timing:  Intermittent    Progression:  Waxing and waning  Risk factors: no anemia, no congestive heart failure, no coronary artery disease, no diabetes and no heart disease        None       Past Medical History:   Diagnosis Date    Hypertension        Past Surgical History: Procedure Laterality Date    TONSILLECTOMY         History reviewed  No pertinent family history  I have reviewed and agree with the history as documented  E-Cigarette/Vaping    E-Cigarette Use Never User      E-Cigarette/Vaping Substances     Social History     Tobacco Use    Smoking status: Current Every Day Smoker     Packs/day: 1 50     Types: Cigarettes    Smokeless tobacco: Never Used   Substance Use Topics    Alcohol use: Yes     Frequency: 2-3 times a week     Drinks per session: 1 or 2     Binge frequency: Monthly    Drug use: Never       Review of Systems   Constitutional: Positive for fatigue  Negative for activity change, diaphoresis and fever  HENT: Negative for congestion, ear pain, rhinorrhea, sinus pressure and sore throat  Eyes: Negative  Respiratory: Negative for cough, chest tightness, shortness of breath and wheezing  Cardiovascular: Positive for chest pain and leg swelling  Negative for palpitations, syncope and near-syncope  Gastrointestinal: Negative for abdominal pain, diarrhea, nausea and vomiting  Endocrine: Negative  Genitourinary: Negative for decreased urine volume, dysuria and flank pain  Musculoskeletal: Positive for back pain  Negative for arthralgias, falls and myalgias  Skin: Negative for pallor and rash  Allergic/Immunologic: Negative  Neurological: Negative for dizziness, loss of consciousness, weakness and headaches  Hematological: Negative  Psychiatric/Behavioral: Negative  All other systems reviewed and are negative  Physical Exam  Physical Exam  Vitals signs and nursing note reviewed  Constitutional:       General: He is in acute distress  Appearance: Normal appearance  He is well-developed  He is not toxic-appearing  Comments: Appears older than stated age   HENT:      Head: Normocephalic and atraumatic  Eyes:      Pupils: Pupils are equal, round, and reactive to light     Neck:      Musculoskeletal: Normal range of motion and neck supple  Cardiovascular:      Rate and Rhythm: Normal rate  Rhythm irregularly irregular  Heart sounds: Normal heart sounds  No murmur  Pulmonary:      Effort: Pulmonary effort is normal  No respiratory distress  Breath sounds: No stridor  Examination of the right-lower field reveals rhonchi  Rhonchi present  No wheezing or rales  Abdominal:      Palpations: Abdomen is soft  Abdomen is not rigid  There is no mass  Tenderness: There is no abdominal tenderness  There is no guarding or rebound  Hernia: No hernia is present  Musculoskeletal: Normal range of motion  Lymphadenopathy:      Cervical: No cervical adenopathy  Skin:     General: Skin is warm and dry  Coloration: Skin is not pale  Findings: No rash  Neurological:      General: No focal deficit present  Mental Status: He is alert and oriented to person, place, and time     Psychiatric:         Mood and Affect: Mood normal          Vital Signs  ED Triage Vitals   Temperature Pulse Respirations Blood Pressure SpO2   08/22/20 1432 08/22/20 1432 08/22/20 1432 08/22/20 1432 08/22/20 1432   (!) 95 6 °F (35 3 °C) (!) 164 (!) 28 134/98 96 %      Temp Source Heart Rate Source Patient Position - Orthostatic VS BP Location FiO2 (%)   08/22/20 1432 08/22/20 1432 08/22/20 1432 08/22/20 1432 --   Oral Monitor Lying Right arm       Pain Score       08/22/20 1651       2           Vitals:    08/22/20 1850 08/22/20 1900 08/22/20 2000 08/22/20 2158   BP:  116/66 117/74    Pulse: 102 (!) 134 (!) 125 (!) 121   Patient Position - Orthostatic VS:             Visual Acuity      ED Medications  Medications   sodium chloride 0 9 % infusion (125 mL/hr Intravenous New Bag 8/22/20 1739)   magnesium oxide (MAG-OX) tablet 400 mg (400 mg Oral Given 8/22/20 1938)   docusate sodium (COLACE) capsule 100 mg (100 mg Oral Not Given 8/22/20 1938)   ondansetron (ZOFRAN) injection 4 mg (has no administration in time range) diltiazem (CARDIZEM) 125 mg in sodium chloride 0 9 % 125 mL infusion ( Intravenous Canceled Entry 8/22/20 1849)   guaiFENesin (MUCINEX) 12 hr tablet 600 mg (600 mg Oral Given 8/22/20 1938)   cefTRIAXone (ROCEPHIN) IVPB (premix) 1,000 mg 50 mL (has no administration in time range)     And   azithromycin (ZITHROMAX) 500 mg in sodium chloride 0 9 % 250 mL IVPB (has no administration in time range)   metoprolol (LOPRESSOR) injection 5 mg (5 mg Intravenous Given 8/22/20 1937)   apixaban (ELIQUIS) tablet 5 mg (has no administration in time range)   nicotine (NICODERM CQ) 21 mg/24 hr TD 24 hr patch 21 mg (has no administration in time range)   oxyCODONE-acetaminophen (PERCOCET) 5-325 mg per tablet 1 tablet (has no administration in time range)   pneumococcal 13-valent conjugate vaccine (PREVNAR-13) IM injection 0 5 mL (has no administration in time range)   diltiazem (CARDIZEM) 125 mg in sodium chloride 0 9 % 125 mL infusion (15 mg/hr Intravenous Rate/Dose Change 8/22/20 1607)   diltiazem (CARDIZEM) injection 20 mg (20 mg Intravenous Given 8/22/20 1449)   sodium chloride 0 9 % bolus 1,000 mL (0 mL Intravenous Stopped 8/22/20 1710)   sodium chloride 0 9 % bolus 1,000 mL (1,000 mL Intravenous New Bag 8/22/20 1700)   cefTRIAXone (ROCEPHIN) IVPB (premix) 2,000 mg 50 mL (0 mg Intravenous Stopped 8/22/20 1710)   diltiazem (CARDIZEM) injection 30 mg (30 mg Intravenous Given 8/22/20 1631)   azithromycin (ZITHROMAX) 500 mg in sodium chloride 0 9 % 250 mL IVPB (0 mg Intravenous Stopped 8/22/20 1806)   apixaban (ELIQUIS) tablet 5 mg (5 mg Oral Given 8/22/20 1709)   metoprolol (LOPRESSOR) injection 5 mg (5 mg Intravenous Given 8/22/20 1701)   potassium chloride 20 mEq IVPB (premix) (20 mEq Intravenous New Bag 8/22/20 1938)       Diagnostic Studies  Results Reviewed     Procedure Component Value Units Date/Time    Blood culture #2 [[id number]] Collected:  08/22/20 1609    Lab Status:  Preliminary result Specimen:  Blood from Hand, Right Updated:  08/22/20 2201     Blood Culture Received in Microbiology Lab  Culture in Progress  Blood culture #1 [[id number]] Collected:  08/22/20 1603    Lab Status:  Preliminary result Specimen:  Blood from Arm, Left Updated:  08/22/20 2201     Blood Culture Received in Microbiology Lab  Culture in Progress  Procalcitonin [[id number]]  (Abnormal) Collected:  08/22/20 1725    Lab Status:  Final result Specimen:  Blood from Arm, Left Updated:  08/22/20 2112     Procalcitonin 0 64 ng/ml     Strep Pneumoniae, Urine [[id number]]  (Normal) Collected:  08/22/20 1727    Lab Status:  Final result Specimen:  Urine, Clean Catch Updated:  08/22/20 2110     Strep pneumoniae antigen, urine Negative    Legionella antigen, urine [[id number]]  (Normal) Collected:  08/22/20 1727    Lab Status:  Final result Specimen:  Urine, Clean Catch Updated:  08/22/20 2110     Legionella Urinary Antigen Negative    Hemoglobin A1C w/ EAG Estimation [[id number]]  (Abnormal) Collected:  08/22/20 1454    Lab Status:  Final result Specimen:  Blood from Arm, Left Updated:  08/22/20 2107     Hemoglobin A1C 6 2 %       mg/dl     TSH, 3rd generation [[id number]]  (Normal) Collected:  08/22/20 1859    Lab Status:  Final result Specimen:  Blood from Hand, Left Updated:  08/22/20 1928     TSH 3RD GENERATON 2 823 uIU/mL     Narrative:       Patients undergoing fluorescein dye angiography may retain small amounts of fluorescein in the body for 48-72 hours post procedure  Samples containing fluorescein can produce falsely depressed TSH values  If the patient had this procedure,a specimen should be resubmitted post fluorescein clearance        Magnesium [[id number]]  (Normal) Collected:  08/22/20 1859    Lab Status:  Final result Specimen:  Blood from Hand, Left Updated:  08/22/20 1914     Magnesium 2 0 mg/dL     Lipid panel [[id number]]  (Abnormal) Collected:  08/22/20 1454    Lab Status:  Final result Specimen:  Blood from Arm, Left Updated:  08/22/20 1755     Cholesterol 110 mg/dL      Triglycerides 146 mg/dL      HDL, Direct 16 mg/dL      LDL Calculated 65 mg/dL      Non-HDL-Chol (CHOL-HDL) 94 mg/dl     Novel Coronavirus (Covid-19),PCR [facility] [[id number]]  (Normal) Collected:  08/22/20 1603    Lab Status:  Final result Specimen:  Nares from Nose Updated:  08/22/20 1703     SARS-CoV-2 Negative    Narrative: The specimen collection materials, transport medium, and/or testing methodology utilized in the production of these test results have been proven to be reliable in a limited validation with an abbreviated program under the Emergency Utilization Authorization provided by the FDA  Testing reported as "Presumptive positive" will be confirmed with secondary testing with a reference laboratory to ensure result accuracy  Clinical caution and judgement should be used with the interpretation of these results with consideration of the clinical impression and other laboratory testing  Testing reported as "Positive" or "Negative" has been proven to be accurate according to standard laboratory validation requirements  All testing is performed with control materials showing appropriate reactivity at standard intervals  Lactic acid [[id number]]  (Normal) Collected:  08/22/20 1603    Lab Status:  Final result Specimen:  Blood from Hand, Right Updated:  08/22/20 1629     LACTIC ACID 1 5 mmol/L     Narrative:       Result may be elevated if tourniquet was used during collection  CBC and differential [[id number]]  (Abnormal) Collected:  08/22/20 1454    Lab Status:  Final result Specimen:  Blood from Arm, Left Updated:  08/22/20 1531     WBC 16 13 Thousand/uL      RBC 5 57 Million/uL      Hemoglobin 16 1 g/dL      Hematocrit 46 6 %      MCV 84 fL      MCH 28 9 pg      MCHC 34 5 g/dL      RDW 13 0 %      MPV 10 9 fL      Platelets 759 Thousands/uL      nRBC 0 /100 WBCs     Narrative: This is an appended report    These results have been appended to a previously verified report  Troponin I [[id number]]  (Normal) Collected:  08/22/20 1454    Lab Status:  Final result Specimen:  Blood from Arm, Left Updated:  08/22/20 1522     Troponin I <0 02 ng/mL     Ethanol [[id number]]  (Normal) Collected:  08/22/20 1454    Lab Status:  Final result Specimen:  Blood from Arm, Left Updated:  08/22/20 1518     Ethanol Lvl <3 mg/dL     Comprehensive metabolic panel [[id number]]  (Abnormal) Collected:  08/22/20 1454    Lab Status:  Final result Specimen:  Blood from Arm, Left Updated:  08/22/20 1518     Sodium 127 mmol/L      Potassium 3 2 mmol/L      Chloride 89 mmol/L      CO2 29 mmol/L      ANION GAP 9 mmol/L      BUN 17 mg/dL      Creatinine 1 58 mg/dL      Glucose 138 mg/dL      Calcium 8 5 mg/dL      AST 54 U/L      ALT 45 U/L      Alkaline Phosphatase 202 U/L      Total Protein 7 3 g/dL      Albumin 2 7 g/dL      Total Bilirubin 0 81 mg/dL      eGFR 45 ml/min/1 73sq m     Narrative:       [place] guidelines for Chronic Kidney Disease (CKD):     Stage 1 with normal or high GFR (GFR > 90 mL/min/1 73 square meters)    Stage 2 Mild CKD (GFR = 60-89 mL/min/1 73 square meters)    Stage 3A Moderate CKD (GFR = 45-59 mL/min/1 73 square meters)    Stage 3B Moderate CKD (GFR = 30-44 mL/min/1 73 square meters)    Stage 4 Severe CKD (GFR = 15-29 mL/min/1 73 square meters)    Stage 5 End Stage CKD (GFR <15 mL/min/1 73 square meters)  Note: GFR calculation is accurate only with a steady state creatinine                 XR chest 1 view portable   ED Interpretation by Marybel Ivan DO (08/22 1542)   RLL infiltrate      Final Result by Maikol Giron MD (08/22 1554)      Patchy right lower lung zone airspace opacity suspicious for pneumonia              Workstation performed: [id number]                    Procedures  ECG 12 Lead Documentation Only    Date/Time: 8/22/2020 2:45 PM  Performed by: Marybel Ivan DO  Authorized by: Marybel Ivan DO Indications / Diagnosis:  Weakness  ECG reviewed by me, the ED Provider: yes    Patient location:  ED  Previous ECG:     Previous ECG:  Unavailable    Comparison to cardiac monitor: Yes    Interpretation:     Interpretation: abnormal    Rate:     ECG rate:  162    ECG rate assessment: tachycardic    Rhythm:     Rhythm: atrial fibrillation    Ectopy:     Ectopy: PAC    QRS:     QRS axis:  Normal  Conduction:     Conduction: normal    ST segments:     ST segments:  Non-specific  T waves:     T waves: non-specific               ED Course  ED Course as of Aug 22 2215   Sat Aug 22, 2020   1513 Heart rate is currently 138 to 165 on Cardizem      1540 Troponin I: <0 02   1544 CXR concerning for RLL pneumonia  Have obtained cultures and lactate  Will start abx      1640 LACTIC ACID: 1 5   1640 As documented in the medical decision making, patient had IV fluids, was given antibiotics, had cardiac evaluation as well as evaluation for sepsis  Discussed the case with both [facility] patient will be given a dose of Lopressor 5 mg and be admitted to step-down  1709 Heart rate 107-126      1709 EXT SARS-COV-2: Negative       US AUDIT      Most Recent Value   Initial Alcohol Screen: US AUDIT-C    1  How often do you have a drink containing alcohol? 4 Filed at: 08/22/2020 1441   2  How many drinks containing alcohol do you have on a typical day you are drinking? 1 Filed at: 08/22/2020 1441   Audit-C Score  5 Filed at: 08/22/2020 1441            HEART Risk Score      Most Recent Value   Heart Score Risk Calculator   History  0 Filed at: 08/22/2020 1641   ECG  1 Filed at: 08/22/2020 1641   Age  2 Filed at: 08/22/2020 1641   Risk Factors  1 Filed at: 08/22/2020 1641   Troponin  0 Filed at: 08/22/2020 1641   HEART Score  4 Filed at: 08/22/2020 1641            CABRERA/DAST-10      Most Recent Value   How many times in the past year have you       Used an illegal drug or used a prescription medication for non-medical reasons? Never Filed at: 08/22/2020 1441                                [facility]  Number of Diagnoses or Management Options  New onset atrial fibrillation [facility]): new and requires workup  Right lower lobe pneumonia: new and requires workup  Weakness: new and requires workup  Diagnosis management comments: Patient presents to the emergency room with an acute onset of uncontrolled atrial fib with a RVR  Heart rate is currently 160s to 170s  Patient has an on known time of onset  Reports that he has been feeling generally weak for about five to seven days  Getting worse and now the diaphoretic  With unknown onset have elected to start rate control medications  Will also provide anticoagulation therapy once labs are returned  Plan that patient will likely be admitted  Patient presented to the ED and was found to be critically ill as demonstrated by the clinical history and primary physical evaluation  Pt had demonstrative findings and derangements of vital signs indicative for severe illness  I personally performed bedside history and evaluation  Interventions to address these clinical needs were ordered/performed  These included, but not necessarily limited to, the ordering and subsequent review of lab studies, imaging and EKG  Please see chart with regards to specific resuscitative interventions  Due to a high probability of clinically significant, life threatening deterioration, the patient required my highest level of care, intervention and attention  I personally spent the documented time directly managing the patient  The critical care time included obtaining a history, examining the patient, ordering and review of studies, arranging urgent treatment with development of a management plan, evaluation of patient's response to treatment, reassessment, and, if warranted, discussions with other providers or consultants  Documentation to the medical record for continuity of care was also required  Patients records pertinent to the emergent presenting condition were reviewed as available  Family was updated as available and appropriate  This critical care time was performed to assess and manage the high probability of imminent, life-threatening deterioration that could result in multi-organ failure if not addressed  It was exclusive of separately billable procedures and treating other patients and teaching time  Please see MDM section and the rest of the note for further information on patient assessment, reassessment, interventions and treatment  Total time was 32 mins exclusive of separate billable procedures  Patient presented to the emergency department and a MSE was performed  The patient was evaluated and diagnosed with acute weakness which was found to be attributable to a new onset atrial fibrillation with rapid ventricular response and a right lower lobe pneumonia  This is a new issue that will require additional planned work-up and treatment in a hospitalized setting  As may have been required as part of this evaluation, clinical laboratory test, radiology imaging and medical testing (I e  EKG) were ordered as necessitated by the patient's presentation  I independently reviewed these studies, imaging and testing  This patient's case is considered to be a considerable risk secondary to the above listed disease process and poses a threat to the patient's well-being and baseline function  Further in-patient diagnostic testing and management, which may include the administration of parenteral medications, is required               Amount and/or Complexity of Data Reviewed  Clinical lab tests: ordered and reviewed  Tests in the radiology section of CPT®: ordered and reviewed  Obtain history from someone other than the patient: yes  Independent visualization of images, tracings, or specimens: yes    Risk of Complications, Morbidity, and/or Mortality  Presenting problems: high  Diagnostic procedures: moderate  Management options: moderate          Disposition  Final diagnoses:   Weakness   New onset atrial fibrillation (HCC)   Right lower lobe pneumonia   Sepsis ([facility] [address] )     Time reflects when diagnosis was documented in both MDM as applicable and the Disposition within this note     Time User Action Codes Description Comment    8/22/2020  3:45 PM Rosalia Samuels Add [R53 1] Weakness     8/22/2020  3:45 PM Rosalia Samuels Add [I48 91] New onset atrial fibrillation ([facility] [address] )     8/22/2020  3:46 PM Rosalia Samuels Add [J18 9] Right lower lobe pneumonia     8/22/2020  4:40 PM Rosalia Samuels Add [A41 9] Sepsis [facility])       ED Disposition     ED Disposition Condition Date/Time Comment    Admit Stable Sat Aug 22, 2020  3:44 PM         Follow-up Information    None         There are no discharge medications for this patient  No discharge procedures on file      PDMP Review       Value Time User    PDMP Reviewed  Yes 8/22/2020  5:11 PM Juan Cartagena MD          ED Provider  Electronically Signed by           Alejandro Kiser DO  08/22/20 9427 [place], DO  08/22/20 9056

## 2020-08-22 NOTE — ASSESSMENT & PLAN NOTE
Unknown etiology  Sodium level is 127, potassium is 3 2  Patient received 2 L of bolus, will supplement potassium  Recheck electrolytes

## 2020-08-22 NOTE — ASSESSMENT & PLAN NOTE
Unknown etiology  Patient received azithromycin and ceftriaxone in the ER-will continue  Follow blood culture, urine for Legionella, pneumonia, procalcitonin  Lactic acid is level is normal  COVID negative  Follow respiratory protocol

## 2020-08-22 NOTE — PLAN OF CARE
Problem: NEUROSENSORY - ADULT  Goal: Achieves stable or improved neurological status  Description: INTERVENTIONS  - Monitor and report changes in neurological status  - Monitor vital signs such as temperature, blood pressure, glucose, and any other labs ordered   - Initiate measures to prevent increased intracranial pressure  - Monitor for seizure activity and implement precautions if appropriate      Outcome: Progressing  Goal: Achieves maximal functionality and self care  Description: INTERVENTIONS  - Monitor swallowing and airway patency with patient fatigue and changes in neurological status  - Encourage and assist patient to increase activity and self care     - Encourage visually impaired, hearing impaired and aphasic patients to use assistive/communication devices  Outcome: Progressing     Problem: CARDIOVASCULAR - ADULT  Goal: Maintains optimal cardiac output and hemodynamic stability  Description: INTERVENTIONS:  - Monitor I/O, vital signs and rhythm  - Monitor for S/S and trends of decreased cardiac output  - Administer and titrate ordered vasoactive medications to optimize hemodynamic stability  - Assess quality of pulses, skin color and temperature  - Assess for signs of decreased coronary artery perfusion  - Instruct patient to report change in severity of symptoms  Outcome: Progressing  Goal: Absence of cardiac dysrhythmias or at baseline rhythm  Description: INTERVENTIONS:  - Continuous cardiac monitoring, vital signs, obtain 12 lead EKG if ordered  - Administer antiarrhythmic and heart rate control medications as ordered  - Monitor electrolytes and administer replacement therapy as ordered  Outcome: Progressing     Problem: RESPIRATORY - ADULT  Goal: Achieves optimal ventilation and oxygenation  Description: INTERVENTIONS:  - Assess for changes in respiratory status  - Assess for changes in mentation and behavior  - Position to facilitate oxygenation and minimize respiratory effort  - Oxygen administered by appropriate delivery if ordered  - Initiate smoking cessation education as indicated  - Encourage broncho-pulmonary hygiene including cough, deep breathe, Incentive Spirometry  - Assess the need for suctioning and aspirate as needed  - Assess and instruct to report SOB or any respiratory difficulty  - Respiratory Therapy support as indicated  Outcome: Progressing     Problem: GASTROINTESTINAL - ADULT  Goal: Minimal or absence of nausea and/or vomiting  Description: INTERVENTIONS:  - Administer IV fluids if ordered to ensure adequate hydration  - Maintain NPO status until nausea and vomiting are resolved  - Nasogastric tube if ordered  - Administer ordered antiemetic medications as needed  - Provide nonpharmacologic comfort measures as appropriate  - Advance diet as tolerated, if ordered  - Consider nutrition services referral to assist patient with adequate nutrition and appropriate food choices  Outcome: Progressing  Goal: Maintains or returns to baseline bowel function  Description: INTERVENTIONS:  - Assess bowel function  - Encourage oral fluids to ensure adequate hydration  - Administer IV fluids if ordered to ensure adequate hydration  - Administer ordered medications as needed  - Encourage mobilization and activity  - Consider nutritional services referral to assist patient with adequate nutrition and appropriate food choices  Outcome: Progressing  Goal: Maintains adequate nutritional intake  Description: INTERVENTIONS:  - Monitor percentage of each meal consumed  - Identify factors contributing to decreased intake, treat as appropriate  - Assist with meals as needed  - Monitor I&O, weight, and lab values if indicated  - Obtain nutrition services referral as needed  Outcome: Progressing     Problem: GENITOURINARY - ADULT  Goal: Maintains or returns to baseline urinary function  Description: INTERVENTIONS:  - Assess urinary function  - Encourage oral fluids to ensure adequate hydration if ordered  - Administer IV fluids as ordered to ensure adequate hydration  - Administer ordered medications as needed  - Offer frequent toileting  - Follow urinary retention protocol if ordered  Outcome: Progressing     Problem: METABOLIC, FLUID AND ELECTROLYTES - ADULT  Goal: Electrolytes maintained within normal limits  Description: INTERVENTIONS:  - Monitor labs and assess patient for signs and symptoms of electrolyte imbalances  - Administer electrolyte replacement as ordered  - Monitor response to electrolyte replacements, including repeat lab results as appropriate  - Instruct patient on fluid and nutrition as appropriate  Outcome: Progressing  Goal: Fluid balance maintained  Description: INTERVENTIONS:  - Monitor labs   - Monitor I/O and WT  - Instruct patient on fluid and nutrition as appropriate  - Assess for signs & symptoms of volume excess or deficit  Outcome: Progressing  Goal: Glucose maintained within target range  Description: INTERVENTIONS:  - Monitor Blood Glucose as ordered  - Assess for signs and symptoms of hyperglycemia and hypoglycemia  - Administer ordered medications to maintain glucose within target range  - Assess nutritional intake and initiate nutrition service referral as needed  Outcome: Progressing     Problem: SKIN/TISSUE INTEGRITY - ADULT  Goal: Skin integrity remains intact  Description: INTERVENTIONS  - Identify patients at risk for skin breakdown  - Assess and monitor skin integrity  - Assess and monitor nutrition and hydration status  - Monitor labs (i e  albumin)  - Assess for incontinence   - Turn and reposition patient  - Assist with mobility/ambulation  - Relieve pressure over bony prominences  - Avoid friction and shearing  - Provide appropriate hygiene as needed including keeping skin clean and dry  - Evaluate need for skin moisturizer/barrier cream  - Collaborate with interdisciplinary team (i e  Nutrition, Rehabilitation, etc )   - Patient/family teaching  Outcome: Progressing  Goal: Oral mucous membranes remain intact  Description: INTERVENTIONS  - Assess oral mucosa and hygiene practices  - Implement preventative oral hygiene regimen  - Implement oral medicated treatments as ordered  - Initiate Nutrition services referral as needed  Outcome: Progressing     Problem: HEMATOLOGIC - ADULT  Goal: Maintains hematologic stability  Description: INTERVENTIONS  - Assess for signs and symptoms of bleeding or hemorrhage  - Monitor labs  - Administer supportive blood products/factors as ordered and appropriate  Outcome: Progressing     Problem: MUSCULOSKELETAL - ADULT  Goal: Maintain or return mobility to safest level of function  Description: INTERVENTIONS:  - Assess patient's ability to carry out ADLs; assess patient's baseline for ADL function and identify physical deficits which impact ability to perform ADLs (bathing, care of mouth/teeth, toileting, grooming, dressing, etc )  - Assess/evaluate cause of self-care deficits   - Assess range of motion  - Assess patient's mobility  - Assess patient's need for assistive devices and provide as appropriate  - Encourage maximum independence but intervene and supervise when necessary  - Involve family in performance of ADLs  - Assess for home care needs following discharge   - Consider OT consult to assist with ADL evaluation and planning for discharge  - Provide patient education as appropriate  Outcome: Progressing

## 2020-08-22 NOTE — ASSESSMENT & PLAN NOTE
Patient is having sepsis secondary to pneumonia-could be the cause  Patient received Cardizem 20 mg, then Cardizem 30 mg, then Lopressor 5 mg IV-then patient received Cardizem drip in the ER  Heart rate is trending down  Continue Cardizem drip target heart rate is less than 100, continue Eliquis  Follow echocardiogram  Follow cardiology consult  Check TSH

## 2020-08-22 NOTE — ASSESSMENT & PLAN NOTE
Upon arrival patient is tachycardic, WBC is 16 13 chest x-ray shows right lower lobe pneumonia  Status post 2 L of bolus  Patient received azithromycin and ceftriaxone in the ER-will continue  Follow respiratory protocol  Follow-up blood culture urine for Legionella, pneumonia antigen, follow procalcitonin  Maintain oxygen saturation more than 90%

## 2020-08-23 NOTE — ASSESSMENT & PLAN NOTE
Upon arrival patient is tachycardic, WBC is 16 13 chest x-ray shows right lower lobe pneumonia  Status post 2 L of bolus  Patient received azithromycin and ceftriaxone in the ER-will continue ceftriaxone only  Follow respiratory protocol  Follow-up blood culture,  Urine antigen for Legionella, pneumonia negative DC azithromycin    Procalcitonin level is 0 64  Maintain oxygen saturation more than 90%

## 2020-08-23 NOTE — RESPIRATORY THERAPY NOTE
RT Protocol Note  Satya Monday 76 y o  male MRN: [id number]  Unit/Bed#: -01 Encounter: [id number]    Assessment    Principal Problem:    Sepsis due to pneumonia [facility])  Active Problems:    New onset a-fib ([address] )    Pneumonia of right lower lobe due to infectious organism    Electrolyte abnormality    Elevated serum creatinine    Tobacco abuse      Home Pulmonary Medications:         Past Medical History:   Diagnosis Date    Hypertension      Social History     Socioeconomic History    Marital status: Single     Spouse name: None    Number of children: None    Years of education: None    Highest education level: None   Occupational History    None   Social Needs    Financial resource strain: None    Food insecurity     Worry: None     Inability: None    Transportation needs     Medical: None     Non-medical: None   Tobacco Use    Smoking status: Current Every Day Smoker     Packs/day: 1 50     Types: Cigarettes    Smokeless tobacco: Never Used   Substance and Sexual Activity    Alcohol use: Yes     Frequency: 2-3 times a week     Drinks per session: 1 or 2     Binge frequency: Monthly    Drug use: Never    Sexual activity: None   Lifestyle    Physical activity     Days per week: None     Minutes per session: None    Stress: None   Relationships    Social connections     Talks on phone: None     Gets together: None     Attends Orthodoxy service: None     Active member of club or organization: None     Attends meetings of clubs or organizations: None     Relationship status: None    Intimate partner violence     Fear of current or ex partner: None     Emotionally abused: None     Physically abused: None     Forced sexual activity: None   Other Topics Concern    None   Social History Narrative    None       Subjective         Objective    Physical Exam:   Assessment Type: Assess only  General Appearance: Alert, Awake  Respiratory Pattern: Normal  Chest Assessment: Chest expansion symmetrical, Trachea midline  Bilateral Breath Sounds: Clear  Cough: None    Vitals:  Blood pressure 117/74, pulse (!) 121, temperature 97 6 °F (36 4 °C), temperature source Oral, resp  rate 21, height 5' 6" (1 676 m), weight 70 3 kg (154 lb 15 7 oz), SpO2 97 %  Imaging and other studies: I have personally reviewed pertinent reports  Plan    Respiratory Plan: Discontinue Protocol        Resp Comments: Pt admitted for chest pain/weakness  Possible sepsis  Pt is a current smoker with ~ 50pk yr history  Pt states he has never had PFT performed (no documentiaon of COPD)  Pt doenies home O2 and CPAP  BS clear, Pt with nodyspnea/no distress  Mild tachycardia  SpO2 97% on RA

## 2020-08-23 NOTE — ASSESSMENT & PLAN NOTE
Unknown etiology  Patient received azithromycin and ceftriaxone in the ER-will continue ceftriaxone only  Follow blood culture,  Urine Legionella, pneumonia negative-DC azithromycin    Procalcitonin is 0 64  Lactic acid is level is normal  COVID negative  Follow respiratory protocol

## 2020-08-23 NOTE — ASSESSMENT & PLAN NOTE
Unknown etiology  Sodium level is improving, potassium still remain 3 3  Will supplement potassium  Monitor labs

## 2020-08-23 NOTE — ASSESSMENT & PLAN NOTE
Patient is having sepsis secondary to pneumonia-could be the cause  Patient received Cardizem 20 mg, then Cardizem 30 mg, then Lopressor 5 mg IV-then patient received Cardizem drip in the ER  Heart rate is trending down  Will discontinue Cardizem drip,-instead will start Cardizem 30 mg q 6 hours, continue Lopressor IV as needed  Depending on response, will adjust dose    May consider digoxin after correcting potassium level  Follow echocardiogram, follow cardiology recommendation  A1c level 6 2%, lipid profile is normal, TSH is normal, magnesium is normal

## 2020-08-23 NOTE — PROGRESS NOTES
Progress Note - Babita Purchase 1955, 76 y o  male MRN: [id number]    Unit/Bed#: -01 Encounter: [id number]    Primary Care Provider: No primary care provider on file  Date and time admitted to hospital: 8/22/2020  2:27 PM        New onset a-fib [facility])  Assessment & Plan  Patient is having sepsis secondary to pneumonia-could be the cause  Patient received Cardizem 20 mg, then Cardizem 30 mg, then Lopressor 5 mg IV-then patient received Cardizem drip in the ER  Heart rate is trending down  Will discontinue Cardizem drip,-instead will start Cardizem 30 mg q 6 hours, continue Lopressor IV as needed  Depending on response, will adjust dose  May consider digoxin after correcting potassium level  Follow echocardiogram, follow cardiology recommendation  A1c level 6 2%, lipid profile is normal, TSH is normal, magnesium is normal      * Sepsis due to pneumonia [facility])  Assessment & Plan  Upon arrival patient is tachycardic, WBC is 16 13 chest x-ray shows right lower lobe pneumonia  Status post 2 L of bolus  Patient received azithromycin and ceftriaxone in the ER-will continue ceftriaxone only  Follow respiratory protocol  Follow-up blood culture,  Urine antigen for Legionella, pneumonia negative DC azithromycin  Procalcitonin level is 0 64  Maintain oxygen saturation more than 90%    Elevated serum creatinine  Assessment & Plan  Serum creatinine improved    Electrolyte abnormality  Assessment & Plan  Unknown etiology  Sodium level is improving, potassium still remain 3 3  Will supplement potassium  Monitor labs    Pneumonia of right lower lobe due to infectious organism  Assessment & Plan  Unknown etiology  Patient received azithromycin and ceftriaxone in the ER-will continue ceftriaxone only  Follow blood culture,  Urine Legionella, pneumonia negative-DC azithromycin    Procalcitonin is 0 64  Lactic acid is level is normal  COVID negative  Follow respiratory protocol    Tobacco abuse  Assessment & Plan  Continue Nicoderm patch        VTE Pharmacologic Prophylaxis:   Pharmacologic: Apixaban (Eliquis)  Mechanical VTE Prophylaxis in Place: Yes    Patient Centered Rounds: I have performed bedside rounds with nursing staff today  Education and Discussions with Family / Patient:  Yes with patient    Time Spent for Care: 30 minutes  More than 50% of total time spent on counseling and coordination of care as described above  Current Length of Stay: 1 day(s)    Current Patient Status: Inpatient   Certification Statement: The patient will continue to require additional inpatient hospital stay due to New onset AFib, pneumonia, sepsis    Discharge Plan / Estimated Discharge Date: To be determined      Code Status: Level 1 - Full Code      Subjective:   Seen and evaluated during the round  Patient denies any significant complaint  Patient is still in AFib    Objective:     Vitals:   Temp (24hrs), Av 4 °F (36 3 °C), Min:95 6 °F (35 3 °C), Max:98 °F (36 7 °C)    Temp:  [95 6 °F (35 3 °C)-98 °F (36 7 °C)] 97 6 °F (36 4 °C)  HR:  [] 82  Resp:  [18-28] 27  BP: ()/(55-98) 104/67  SpO2:  [95 %-99 %] 96 %  Body mass index is 25 01 kg/m²  Input and Output Summary (last 24 hours): Intake/Output Summary (Last 24 hours) at 2020 1027  Last data filed at 2020 0852  Gross per 24 hour   Intake 4974 01 ml   Output 725 ml   Net 4249 01 ml       Physical Exam:     Physical Exam  Vitals signs and nursing note reviewed  Exam conducted with a chaperone present  Constitutional:       Appearance: He is not diaphoretic  HENT:      Mouth/Throat:      Pharynx: No oropharyngeal exudate  Eyes:      General: No scleral icterus  Extraocular Movements: Extraocular movements intact  Conjunctiva/sclera: Conjunctivae normal       Pupils: Pupils are equal, round, and reactive to light  Neck:      Musculoskeletal: Normal range of motion and neck supple     Cardiovascular:      Rate and Rhythm: Tachycardia present  Rhythm irregular  Heart sounds: No murmur  No gallop  Pulmonary:      Effort: Pulmonary effort is normal  No respiratory distress  Breath sounds: Rales present  No wheezing or rhonchi  Chest:      Chest wall: No tenderness  Abdominal:      General: Abdomen is flat  Bowel sounds are normal  There is no distension  Musculoskeletal:         General: No swelling  Neurological:      General: No focal deficit present  Mental Status: He is alert and oriented to person, place, and time  Psychiatric:         Mood and Affect: Mood normal          Additional Data:     Labs:    Results from last 7 days   Lab Units 08/23/20  0522 08/22/20  1454   WBC Thousand/uL 12 85* 16 13*   HEMOGLOBIN g/dL 13 4 16 1   HEMATOCRIT % 39 4 46 6   PLATELETS Thousands/uL 195 239   LYMPHO PCT %  --  10*   MONO PCT %  --  6   EOS PCT %  --  0     Results from last 7 days   Lab Units 08/23/20  0522   POTASSIUM mmol/L 3 3*   CHLORIDE mmol/L 98*   CO2 mmol/L 27   BUN mg/dL 14   CREATININE mg/dL 0 99   CALCIUM mg/dL 7 5*   ALK PHOS U/L 142*   ALT U/L 31   AST U/L 51*           * I Have Reviewed All Lab Data Listed Above  * Additional Pertinent Lab Tests Reviewed: All Labs Within Last 24 Hours Reviewed        Recent Cultures (last 7 days):     Results from last 7 days   Lab Units 08/22/20  1727 08/22/20  1603   BLOOD CULTURE   --  Received in Microbiology Lab  Culture in Progress  Received in Microbiology Lab  Culture in Progress     LEGIONELLA URINARY ANTIGEN  Negative  --        Last 24 Hours Medication List:   Current Facility-Administered Medications   Medication Dose Route Frequency Provider Last Rate    apixaban  5 mg Oral BID Yuni Faith MD      cefTRIAXone  1,000 mg Intravenous Q24H Yuni Faith MD      diltiazem  30 mg Oral Q6H [address] Fernando Villa MD      docusate sodium  100 mg Oral BID Yuni Faith MD      guaiFENesin  600 mg Oral BID Yuni Faith MD      magnesium oxide  400 mg Oral BID Fredis Jfefery MD      metoprolol  5 mg Intravenous Q6H PRN Fredis Jeffery MD      nicotine  21 mg Transdermal Daily Fernando Villa MD      ondansetron  4 mg Intravenous Q6H PRN Fredis Jeffery MD      oxyCODONE-acetaminophen  1 tablet Oral Q4H PRN Fredis Jeffery MD      pneumococcal 13-valent conjugate vaccine  0 5 mL Intramuscular Prior to discharge Fredis Jeffery MD      potassium chloride  20 mEq Intravenous Ousmane Holley MD 20 mEq (08/23/20 0845)        Today, Patient Was Seen By: Fredis Jeffery MD    ** Please Note: Dragon 360 Dictation voice to text software may have been used in the creation of this document   **

## 2020-08-23 NOTE — PROGRESS NOTES
PO Cardizem ordered  /57  Order states to hold for systolic <155  Dr Trina Powell notified and is to lower parameters PO dose can be administered  Cardizem gtt currently at 13 5mg/hr  Cardizem gtt turned off  Instructed to discontinue and not to continue with Cardizem gtt until PO takes effect

## 2020-08-24 NOTE — PLAN OF CARE
Problem: NEUROSENSORY - ADULT  Goal: Achieves stable or improved neurological status  Description: INTERVENTIONS  - Monitor and report changes in neurological status  - Monitor vital signs such as temperature, blood pressure, glucose, and any other labs ordered   - Initiate measures to prevent increased intracranial pressure  - Monitor for seizure activity and implement precautions if appropriate      Outcome: Progressing  Goal: Achieves maximal functionality and self care  Description: INTERVENTIONS  - Monitor swallowing and airway patency with patient fatigue and changes in neurological status  - Encourage and assist patient to increase activity and self care     - Encourage visually impaired, hearing impaired and aphasic patients to use assistive/communication devices  Outcome: Progressing     Problem: CARDIOVASCULAR - ADULT  Goal: Maintains optimal cardiac output and hemodynamic stability  Description: INTERVENTIONS:  - Monitor I/O, vital signs and rhythm  - Monitor for S/S and trends of decreased cardiac output  - Administer and titrate ordered vasoactive medications to optimize hemodynamic stability  - Assess quality of pulses, skin color and temperature  - Assess for signs of decreased coronary artery perfusion  - Instruct patient to report change in severity of symptoms  Outcome: Progressing  Goal: Absence of cardiac dysrhythmias or at baseline rhythm  Description: INTERVENTIONS:  - Continuous cardiac monitoring, vital signs, obtain 12 lead EKG if ordered  - Administer antiarrhythmic and heart rate control medications as ordered  - Monitor electrolytes and administer replacement therapy as ordered  Outcome: Progressing     Problem: RESPIRATORY - ADULT  Goal: Achieves optimal ventilation and oxygenation  Description: INTERVENTIONS:  - Assess for changes in respiratory status  - Assess for changes in mentation and behavior  - Position to facilitate oxygenation and minimize respiratory effort  - Oxygen administered by appropriate delivery if ordered  - Initiate smoking cessation education as indicated  - Encourage broncho-pulmonary hygiene including cough, deep breathe, Incentive Spirometry  - Assess the need for suctioning and aspirate as needed  - Assess and instruct to report SOB or any respiratory difficulty  - Respiratory Therapy support as indicated  Outcome: Progressing     Problem: GASTROINTESTINAL - ADULT  Goal: Minimal or absence of nausea and/or vomiting  Description: INTERVENTIONS:  - Administer IV fluids if ordered to ensure adequate hydration  - Maintain NPO status until nausea and vomiting are resolved  - Nasogastric tube if ordered  - Administer ordered antiemetic medications as needed  - Provide nonpharmacologic comfort measures as appropriate  - Advance diet as tolerated, if ordered  - Consider nutrition services referral to assist patient with adequate nutrition and appropriate food choices  Outcome: Progressing  Goal: Maintains or returns to baseline bowel function  Description: INTERVENTIONS:  - Assess bowel function  - Encourage oral fluids to ensure adequate hydration  - Administer IV fluids if ordered to ensure adequate hydration  - Administer ordered medications as needed  - Encourage mobilization and activity  - Consider nutritional services referral to assist patient with adequate nutrition and appropriate food choices  Outcome: Progressing  Goal: Maintains adequate nutritional intake  Description: INTERVENTIONS:  - Monitor percentage of each meal consumed  - Identify factors contributing to decreased intake, treat as appropriate  - Assist with meals as needed  - Monitor I&O, weight, and lab values if indicated  - Obtain nutrition services referral as needed  Outcome: Progressing     Problem: GENITOURINARY - ADULT  Goal: Maintains or returns to baseline urinary function  Description: INTERVENTIONS:  - Assess urinary function  - Encourage oral fluids to ensure adequate hydration if ordered  - Administer IV fluids as ordered to ensure adequate hydration  - Administer ordered medications as needed  - Offer frequent toileting  - Follow urinary retention protocol if ordered  Outcome: Progressing     Problem: METABOLIC, FLUID AND ELECTROLYTES - ADULT  Goal: Electrolytes maintained within normal limits  Description: INTERVENTIONS:  - Monitor labs and assess patient for signs and symptoms of electrolyte imbalances  - Administer electrolyte replacement as ordered  - Monitor response to electrolyte replacements, including repeat lab results as appropriate  - Instruct patient on fluid and nutrition as appropriate  Outcome: Progressing  Goal: Fluid balance maintained  Description: INTERVENTIONS:  - Monitor labs   - Monitor I/O and WT  - Instruct patient on fluid and nutrition as appropriate  - Assess for signs & symptoms of volume excess or deficit  Outcome: Progressing  Goal: Glucose maintained within target range  Description: INTERVENTIONS:  - Monitor Blood Glucose as ordered  - Assess for signs and symptoms of hyperglycemia and hypoglycemia  - Administer ordered medications to maintain glucose within target range  - Assess nutritional intake and initiate nutrition service referral as needed  Outcome: Progressing     Problem: SKIN/TISSUE INTEGRITY - ADULT  Goal: Skin integrity remains intact  Description: INTERVENTIONS  - Identify patients at risk for skin breakdown  - Assess and monitor skin integrity  - Assess and monitor nutrition and hydration status  - Monitor labs (i e  albumin)  - Assess for incontinence   - Turn and reposition patient  - Assist with mobility/ambulation  - Relieve pressure over bony prominences  - Avoid friction and shearing  - Provide appropriate hygiene as needed including keeping skin clean and dry  - Evaluate need for skin moisturizer/barrier cream  - Collaborate with interdisciplinary team (i e  Nutrition, Rehabilitation, etc )   - Patient/family teaching  Outcome: Progressing  Goal: Oral mucous membranes remain intact  Description: INTERVENTIONS  - Assess oral mucosa and hygiene practices  - Implement preventative oral hygiene regimen  - Implement oral medicated treatments as ordered  - Initiate Nutrition services referral as needed  Outcome: Progressing     Problem: HEMATOLOGIC - ADULT  Goal: Maintains hematologic stability  Description: INTERVENTIONS  - Assess for signs and symptoms of bleeding or hemorrhage  - Monitor labs  - Administer supportive blood products/factors as ordered and appropriate  Outcome: Progressing     Problem: MUSCULOSKELETAL - ADULT  Goal: Maintain or return mobility to safest level of function  Description: INTERVENTIONS:  - Assess patient's ability to carry out ADLs; assess patient's baseline for ADL function and identify physical deficits which impact ability to perform ADLs (bathing, care of mouth/teeth, toileting, grooming, dressing, etc )  - Assess/evaluate cause of self-care deficits   - Assess range of motion  - Assess patient's mobility  - Assess patient's need for assistive devices and provide as appropriate  - Encourage maximum independence but intervene and supervise when necessary  - Involve family in performance of ADLs  - Assess for home care needs following discharge   - Consider OT consult to assist with ADL evaluation and planning for discharge  - Provide patient education as appropriate  Outcome: Progressing     Problem: Potential for Falls  Goal: Patient will remain free of falls  Description: INTERVENTIONS:  - Assess patient frequently for physical needs  -  Identify cognitive and physical deficits and behaviors that affect risk of falls    -  [place] fall precautions as indicated by assessment   - Educate patient/family on patient safety including physical limitations  - Instruct patient to call for assistance with activity based on assessment  - Modify environment to reduce risk of injury  - Consider OT/PT consult to assist with strengthening/mobility  Outcome: Progressing

## 2020-08-24 NOTE — CONSULTS
Consultation - Cardiology   Brittany Casper 76 y o  male MRN: [id number]  Unit/Bed#: -01 Encounter: [id number]    Assessment/Plan     Assessment:  Sepsis secondary to pneumonia  Tobacco use disorder  Afib with RVR- now on eliquis  Peripheral edema    Plan:  - Agree with echocardiogram, pending at this time  - Continue eliquis at present dose  - stop lopressor, start toprol xl 50 mg BID  - digoxin 500 mcg IV now    --> then 250 mcg IV at 4 pm   --> then 125 mcg IV at 8 pm   --> then 125 mcg orally starting tomorrow   --> check digoxin level in 48 hours  - stop cardizem  - monitor kidney function and electrolytes   - discussed smoking cessation with patient   - will keep npo after midnight for saida cardioversion tomorrow (if able to)    History of Present Illness   Physician Requesting Consult: Kerline Alfaro MD  Reason for Consult / Principal Problem: afib with rvr   HPI: Brittany Casper is a 76y o  year old male who presented to the ED for concerns with fevers, chills, swelling in feet, and coughing  He reports that he had chest pain that was worse with laying down  He never felt heart racing symptoms  He was found to have pneumonia and considered septic  He was also found to be in afib with rvr which appears new for him  He has no other cardiac history to report  He was put on eliquis  He has been on bb and cardizem for rate control  His rates have remained uncontrolled  He reports he is overall feeling much better  He has no chest pain  His coughing has improved  His fevers have improved  Inpatient consult to Cardiology  Consult performed by: Zafar Rocha PA-C  Consult ordered by: Kerline Alfaro MD          Review of Systems   Constitutional: Positive for chills, fatigue and fever  Respiratory: Positive for cough, chest tightness and shortness of breath  Cardiovascular: Positive for chest pain and leg swelling  Negative for palpitations  Gastrointestinal: Negative for nausea  Musculoskeletal: Negative for arthralgias  Skin: Negative for color change, pallor and rash  Neurological: Negative for dizziness, syncope and light-headedness  Psychiatric/Behavioral: Negative for agitation, behavioral problems and confusion  Historical Information   Past Medical History:   Diagnosis Date    Hypertension      Past Surgical History:   Procedure Laterality Date    TONSILLECTOMY       Social History     Substance and Sexual Activity   Alcohol Use Yes    Frequency: 2-3 times a week    Drinks per session: 1 or 2    Binge frequency: Monthly     Social History     Substance and Sexual Activity   Drug Use Never     E-Cigarette/Vaping    E-Cigarette Use Never User      E-Cigarette/Vaping Substances     Social History     Tobacco Use   Smoking Status Current Every Day Smoker    Packs/day: 1 50    Types: Cigarettes   Smokeless Tobacco Never Used     Family History: non-contributory    Meds/Allergies   all current active meds have been reviewed  No Known Allergies    Objective   Vitals: Blood pressure 117/80, pulse (!) 124, temperature 98 °F (36 7 °C), temperature source Oral, resp  rate (!) 24, height 5' 6" (1 676 m), weight 70 4 kg (155 lb 3 3 oz), SpO2 97 %  Orthostatic Blood Pressures      Most Recent Value   Blood Pressure  117/80 filed at 08/24/2020 0907   Patient Position - Orthostatic VS  Sitting filed at 08/24/2020 0800            Intake/Output Summary (Last 24 hours) at 8/24/2020 1051  Last data filed at 8/24/2020 0536  Gross per 24 hour   Intake 1270 ml   Output 1225 ml   Net 45 ml       Invasive Devices     Peripheral Intravenous Line            Peripheral IV 08/22/20 Left Antecubital 1 day    Peripheral IV 08/22/20 Right Forearm 1 day                Physical Exam  Constitutional:       Appearance: Normal appearance  HENT:      Head: Normocephalic and atraumatic  Cardiovascular:      Rate and Rhythm: Rhythm irregular  Heart sounds: No murmur  No friction rub   No gallop  Pulmonary:      Effort: Pulmonary effort is normal  No respiratory distress  Breath sounds: No wheezing  Comments: Distant breath sounds  Abdominal:      General: Abdomen is flat  There is no distension  Musculoskeletal:         General: Swelling present  No tenderness  Skin:     General: Skin is warm and dry  Capillary Refill: Capillary refill takes less than 2 seconds  Neurological:      General: No focal deficit present  Mental Status: He is alert and oriented to person, place, and time  Psychiatric:         Mood and Affect: Mood normal          Lab Results:   I have personally reviewed pertinent lab results  CBC with diff:   Results from last 7 days   Lab Units 08/23/20  0522   WBC Thousand/uL 12 85*   RBC Million/uL 4 64   HEMOGLOBIN g/dL 13 4   HEMATOCRIT % 39 4   MCV fL 85   MCH pg 28 9   MCHC g/dL 34 0   RDW % 13 3   MPV fL 10 8   PLATELETS Thousands/uL 195     CMP:   Results from last 7 days   Lab Units 08/24/20  0530   SODIUM mmol/L 132*   POTASSIUM mmol/L 4 2   CHLORIDE mmol/L 98*   CO2 mmol/L 28   BUN mg/dL 13   CREATININE mg/dL 0 84   CALCIUM mg/dL 7 9*   AST U/L 84*   ALT U/L 54   ALK PHOS U/L 244*   EGFR ml/min/1 73sq m 91     Troponin:   0   Lab Value Date/Time    TROPONINI <0 02 08/22/2020 1454     BNP:   Results from last 7 days   Lab Units 08/24/20  0530   POTASSIUM mmol/L 4 2   CHLORIDE mmol/L 98*   CO2 mmol/L 28   BUN mg/dL 13   CREATININE mg/dL 0 84   CALCIUM mg/dL 7 9*   EGFR ml/min/1 73sq m 91     Coags:     TSH:   Results from last 7 days   Lab Units 08/22/20  1859   TSH 3RD GENERATON uIU/mL 2 823     Imaging: I have personally reviewed pertinent reports      EKG: afib with rvr rates 120-140

## 2020-08-24 NOTE — ASSESSMENT & PLAN NOTE
Upon arrival patient is tachycardic, WBC is 16 13 chest x-ray shows right lower lobe pneumonia  Status post 2 L of bolus  Patient received azithromycin and ceftriaxone in the ER-will continue ceftriaxone only  Follow respiratory protocol  Blood culture shows no growth  Urine antigen for Legionella, pneumonia negative DC azithromycin    Procalcitonin level is 0 64  Maintain oxygen saturation more than 90%

## 2020-08-24 NOTE — PLAN OF CARE
Problem: NEUROSENSORY - ADULT  Goal: Achieves stable or improved neurological status  Description: INTERVENTIONS  - Monitor and report changes in neurological status  - Monitor vital signs such as temperature, blood pressure, glucose, and any other labs ordered   - Initiate measures to prevent increased intracranial pressure  - Monitor for seizure activity and implement precautions if appropriate      Outcome: Progressing  Goal: Achieves maximal functionality and self care  Description: INTERVENTIONS  - Monitor swallowing and airway patency with patient fatigue and changes in neurological status  - Encourage and assist patient to increase activity and self care     - Encourage visually impaired, hearing impaired and aphasic patients to use assistive/communication devices  Outcome: Progressing     Problem: CARDIOVASCULAR - ADULT  Goal: Maintains optimal cardiac output and hemodynamic stability  Description: INTERVENTIONS:  - Monitor I/O, vital signs and rhythm  - Monitor for S/S and trends of decreased cardiac output  - Administer and titrate ordered vasoactive medications to optimize hemodynamic stability  - Assess quality of pulses, skin color and temperature  - Assess for signs of decreased coronary artery perfusion  - Instruct patient to report change in severity of symptoms  Outcome: Progressing  Goal: Absence of cardiac dysrhythmias or at baseline rhythm  Description: INTERVENTIONS:  - Continuous cardiac monitoring, vital signs, obtain 12 lead EKG if ordered  - Administer antiarrhythmic and heart rate control medications as ordered  - Monitor electrolytes and administer replacement therapy as ordered  Outcome: Progressing     Problem: RESPIRATORY - ADULT  Goal: Achieves optimal ventilation and oxygenation  Description: INTERVENTIONS:  - Assess for changes in respiratory status  - Assess for changes in mentation and behavior  - Position to facilitate oxygenation and minimize respiratory effort  - Oxygen administered by appropriate delivery if ordered  - Initiate smoking cessation education as indicated  - Encourage broncho-pulmonary hygiene including cough, deep breathe, Incentive Spirometry  - Assess the need for suctioning and aspirate as needed  - Assess and instruct to report SOB or any respiratory difficulty  - Respiratory Therapy support as indicated  Outcome: Progressing     Problem: GASTROINTESTINAL - ADULT  Goal: Minimal or absence of nausea and/or vomiting  Description: INTERVENTIONS:  - Administer IV fluids if ordered to ensure adequate hydration  - Maintain NPO status until nausea and vomiting are resolved  - Nasogastric tube if ordered  - Administer ordered antiemetic medications as needed  - Provide nonpharmacologic comfort measures as appropriate  - Advance diet as tolerated, if ordered  - Consider nutrition services referral to assist patient with adequate nutrition and appropriate food choices  Outcome: Progressing  Goal: Maintains or returns to baseline bowel function  Description: INTERVENTIONS:  - Assess bowel function  - Encourage oral fluids to ensure adequate hydration  - Administer IV fluids if ordered to ensure adequate hydration  - Administer ordered medications as needed  - Encourage mobilization and activity  - Consider nutritional services referral to assist patient with adequate nutrition and appropriate food choices  Outcome: Progressing  Goal: Maintains adequate nutritional intake  Description: INTERVENTIONS:  - Monitor percentage of each meal consumed  - Identify factors contributing to decreased intake, treat as appropriate  - Assist with meals as needed  - Monitor I&O, weight, and lab values if indicated  - Obtain nutrition services referral as needed  Outcome: Progressing     Problem: GENITOURINARY - ADULT  Goal: Maintains or returns to baseline urinary function  Description: INTERVENTIONS:  - Assess urinary function  - Encourage oral fluids to ensure adequate hydration if ordered  - Administer IV fluids as ordered to ensure adequate hydration  - Administer ordered medications as needed  - Offer frequent toileting  - Follow urinary retention protocol if ordered  Outcome: Progressing     Problem: METABOLIC, FLUID AND ELECTROLYTES - ADULT  Goal: Electrolytes maintained within normal limits  Description: INTERVENTIONS:  - Monitor labs and assess patient for signs and symptoms of electrolyte imbalances  - Administer electrolyte replacement as ordered  - Monitor response to electrolyte replacements, including repeat lab results as appropriate  - Instruct patient on fluid and nutrition as appropriate  Outcome: Progressing  Goal: Fluid balance maintained  Description: INTERVENTIONS:  - Monitor labs   - Monitor I/O and WT  - Instruct patient on fluid and nutrition as appropriate  - Assess for signs & symptoms of volume excess or deficit  Outcome: Progressing  Goal: Glucose maintained within target range  Description: INTERVENTIONS:  - Monitor Blood Glucose as ordered  - Assess for signs and symptoms of hyperglycemia and hypoglycemia  - Administer ordered medications to maintain glucose within target range  - Assess nutritional intake and initiate nutrition service referral as needed  Outcome: Progressing     Problem: SKIN/TISSUE INTEGRITY - ADULT  Goal: Skin integrity remains intact  Description: INTERVENTIONS  - Identify patients at risk for skin breakdown  - Assess and monitor skin integrity  - Assess and monitor nutrition and hydration status  - Monitor labs (i e  albumin)  - Assess for incontinence   - Turn and reposition patient  - Assist with mobility/ambulation  - Relieve pressure over bony prominences  - Avoid friction and shearing  - Provide appropriate hygiene as needed including keeping skin clean and dry  - Evaluate need for skin moisturizer/barrier cream  - Collaborate with interdisciplinary team (i e  Nutrition, Rehabilitation, etc )   - Patient/family teaching  Outcome: Progressing  Goal: Oral mucous membranes remain intact  Description: INTERVENTIONS  - Assess oral mucosa and hygiene practices  - Implement preventative oral hygiene regimen  - Implement oral medicated treatments as ordered  - Initiate Nutrition services referral as needed  Outcome: Progressing     Problem: HEMATOLOGIC - ADULT  Goal: Maintains hematologic stability  Description: INTERVENTIONS  - Assess for signs and symptoms of bleeding or hemorrhage  - Monitor labs  - Administer supportive blood products/factors as ordered and appropriate  Outcome: Progressing     Problem: MUSCULOSKELETAL - ADULT  Goal: Maintain or return mobility to safest level of function  Description: INTERVENTIONS:  - Assess patient's ability to carry out ADLs; assess patient's baseline for ADL function and identify physical deficits which impact ability to perform ADLs (bathing, care of mouth/teeth, toileting, grooming, dressing, etc )  - Assess/evaluate cause of self-care deficits   - Assess range of motion  - Assess patient's mobility  - Assess patient's need for assistive devices and provide as appropriate  - Encourage maximum independence but intervene and supervise when necessary  - Involve family in performance of ADLs  - Assess for home care needs following discharge   - Consider OT consult to assist with ADL evaluation and planning for discharge  - Provide patient education as appropriate  Outcome: Progressing     Problem: Potential for Falls  Goal: Patient will remain free of falls  Description: INTERVENTIONS:  - Assess patient frequently for physical needs  -  Identify cognitive and physical deficits and behaviors that affect risk of falls    -  [place] fall precautions as indicated by assessment   - Educate patient/family on patient safety including physical limitations  - Instruct patient to call for assistance with activity based on assessment  - Modify environment to reduce risk of injury  - Consider OT/PT consult to assist with strengthening/mobility  Outcome: Progressing

## 2020-08-24 NOTE — PROGRESS NOTES
Progress Note - Irma Poon 1955, 76 y o  male MRN: [id number]    Unit/Bed#: -01 Encounter: [id number]    Primary Care Provider: June Richardson DO   Date and time admitted to hospital: 8/22/2020  2:27 PM        New onset a-fib [facility])  Assessment & Plan  Patient is having sepsis secondary to pneumonia-could be the cause  Is still in AFib with RVR  Cardiology consult appreciated-continue Toprol-XL 50 mg b i d   A1c level 6 2%, lipid profile is normal, TSH is normal, magnesium is normal  Pending echocardiogram      * Sepsis due to pneumonia [facility])  Assessment & Plan  Upon arrival patient is tachycardic, WBC is 16 13 chest x-ray shows right lower lobe pneumonia  Status post 2 L of bolus  Patient received azithromycin and ceftriaxone in the ER-will continue ceftriaxone only  Follow respiratory protocol  Blood culture shows no growth  Urine antigen for Legionella, pneumonia negative DC azithromycin  Procalcitonin level is 0 64  Maintain oxygen saturation more than 90%    Elevated serum creatinine  Assessment & Plan  Serum creatinine improved    Electrolyte abnormality  Assessment & Plan  Unknown etiology  Sodium level is improving,   Monitor labs    Pneumonia of right lower lobe due to infectious organism  Assessment & Plan  Unknown etiology  Patient received azithromycin and ceftriaxone in the ER-will continue ceftriaxone only  Blood culture shows no growth  Urine Legionella, pneumonia negative-DC azithromycin  Procalcitonin is 0 64  Lactic acid is level is normal  COVID negative  Follow respiratory protocol    Tobacco abuse  Assessment & Plan  Continue Nicoderm patch        VTE Pharmacologic Prophylaxis:   Pharmacologic: Apixaban (Eliquis)  Mechanical VTE Prophylaxis in Place: Yes    Patient Centered Rounds: I have performed bedside rounds with nursing staff today      Discussions with Specialists or Other Care Team Provider:  Cardiology    Education and Discussions with Family / Patient:  Yes with patient    Time Spent for Care: 30 minutes  More than 50% of total time spent on counseling and coordination of care as described above  Current Length of Stay: 2 day(s)    Current Patient Status: Inpatient   Certification Statement: The patient will continue to require additional inpatient hospital stay due to AFib with RVR, sepsis    Discharge Plan / Estimated Discharge Date: To be determined      Code Status: Level 1 - Full Code      Subjective:   Seen and evaluated during the round  Patient denies any significant complaint other than leg swelling  Denies any chest pain, short of breath nausea vomiting, diarrhea  Objective:     Vitals:   Temp (24hrs), Av 6 °F (36 4 °C), Min:96 1 °F (35 6 °C), Max:98 5 °F (36 9 °C)    Temp:  [96 1 °F (35 6 °C)-98 5 °F (36 9 °C)] 96 1 °F (35 6 °C)  HR:  [] 114  Resp:  [20-31] 27  BP: (102-171)/(65-92) 111/69  SpO2:  [95 %-98 %] 96 %  Body mass index is 25 05 kg/m²  Input and Output Summary (last 24 hours): Intake/Output Summary (Last 24 hours) at 2020 1212  Last data filed at 2020 0536  Gross per 24 hour   Intake 770 ml   Output 1225 ml   Net -455 ml       Physical Exam:     Physical Exam  Vitals signs and nursing note reviewed  Exam conducted with a chaperone present  Constitutional:       Appearance: He is not diaphoretic  HENT:      Head: Normocephalic and atraumatic  Mouth/Throat:      Mouth: Mucous membranes are dry  Pharynx: No oropharyngeal exudate or posterior oropharyngeal erythema  Eyes:      General: No scleral icterus  Extraocular Movements: Extraocular movements intact  Conjunctiva/sclera: Conjunctivae normal       Pupils: Pupils are equal, round, and reactive to light  Neck:      Musculoskeletal: Normal range of motion  Cardiovascular:      Rate and Rhythm: Tachycardia present  Rhythm irregular  Pulses: Normal pulses  Heart sounds: No murmur  No gallop      Pulmonary:      Effort: Pulmonary effort is normal  No respiratory distress  Breath sounds: Normal breath sounds  No wheezing or rales  Abdominal:      General: Abdomen is flat  Bowel sounds are normal  There is no distension  Palpations: Abdomen is soft  Tenderness: There is no abdominal tenderness  There is no guarding  Musculoskeletal: Normal range of motion  General: No swelling or deformity  Skin:     General: Skin is warm  Capillary Refill: Capillary refill takes less than 2 seconds  Coloration: Skin is not jaundiced  Findings: No bruising  Neurological:      General: No focal deficit present  Mental Status: He is alert and oriented to person, place, and time  Psychiatric:         Mood and Affect: Mood normal          Additional Data:     Labs:    Results from last 7 days   Lab Units 08/23/20  0522 08/22/20  1454   WBC Thousand/uL 12 85* 16 13*   HEMOGLOBIN g/dL 13 4 16 1   HEMATOCRIT % 39 4 46 6   PLATELETS Thousands/uL 195 239   LYMPHO PCT %  --  10*   MONO PCT %  --  6   EOS PCT %  --  0     Results from last 7 days   Lab Units 08/24/20  0530   POTASSIUM mmol/L 4 2   CHLORIDE mmol/L 98*   CO2 mmol/L 28   BUN mg/dL 13   CREATININE mg/dL 0 84   CALCIUM mg/dL 7 9*   ALK PHOS U/L 244*   ALT U/L 54   AST U/L 84*           * I Have Reviewed All Lab Data Listed Above  * Additional Pertinent Lab Tests Reviewed: All Labs Within Last 24 Hours Reviewed        Recent Cultures (last 7 days):     Results from last 7 days   Lab Units 08/22/20  1727 08/22/20  1603   BLOOD CULTURE   --  No Growth at 24 hrs  No Growth at 24 hrs     LEGIONELLA URINARY ANTIGEN  Negative  --        Last 24 Hours Medication List:   Current Facility-Administered Medications   Medication Dose Route Frequency Provider Last Rate    apixaban  5 mg Oral BID Ciera Carlton MD      cefTRIAXone  1,000 mg Intravenous Q24H Ciera Carlton MD Stopped (08/23/20 1708)    docusate sodium  100 mg Oral BID MD Susanne Obrien guaiFENesin  600 mg Oral BID Fabienne Dhillon MD      magnesium oxide  400 mg Oral BID Fabienne Dhillon MD      metoprolol  5 mg Intravenous Q6H PRN Fabienne Dhillon MD      metoprolol succinate  50 mg Oral BID [place], PA-C      nicotine  21 mg Transdermal Daily Fabienne Dhillon MD      ondansetron  4 mg Intravenous Q6H PRN Fabienne Dhillon MD      oxyCODONE-acetaminophen  1 tablet Oral Q4H PRN Fabienne Dhillon MD      pneumococcal 13-valent conjugate vaccine  0 5 mL Intramuscular Prior to discharge Fabienne Dhillon MD          Today, Patient Was Seen By: Fabienne Dhillon MD    ** Please Note: Dragon 360 Dictation voice to text software may have been used in the creation of this document   **

## 2020-08-24 NOTE — PROGRESS NOTES
Chart reviewed aware of medical management  Admit with sepsis - PNA with new Afib  - On Eliquis        CM met with patient and daughter at the bedside,baseline information  was obtained  CM discussed the role of CM in helping the patient develop a discharge plan and assist the patient in carry out their plan  Patient has identified:   Aliyah as his primary   She  is able to assist upon discharge  No POA or Advance Directive: Pt verbalized Aliyah would be the person assisting with decisions with making if need be  Liz Sutherland PCP:   Note: patient is requesting a New PCP as he does not want to travel to [place]- He would like to go to the [employer] [facility] [employer] [facility] in [facility]  Pt asked if CM will make an appointment for him  Pt requested an appointment this week as she does not drive, and his daughter is available to transport him  Pt has a prescription plan and uses  [facility] in [facility]  Patient is not a :    Pt denies SA/MH history  Pt resides alone in a 3 story home with 12 steps to the bed/bathroom  He has 1 adult daughter  Functional level PTA: I/pta  DME use:  none  Prior use of Home Care or STR: none  Transportation:does not drive- daughter available upon dc  Community Resources: none  CM reviewed d/c planning process including the following: identifying help at home, patient preference for d/c planning needs, availability of treatment team to discuss questions or concerns patient and/or family may have regarding understanding medications and recognizing signs and symptoms once discharged  CM also encouraged patient to follow up with all recommended appointments after discharge  Patient advised of importance for patient and family to participate in managing patients medical well being  CM has established new PCP for patient: 1st appointment is Friday with  Dr Thu Jacob information on AVS     Will continue to follow- dc plan is home

## 2020-08-24 NOTE — ASSESSMENT & PLAN NOTE
Patient is having sepsis secondary to pneumonia-could be the cause  Is still in AFib with RVR  Cardiology consult appreciated-continue Toprol-XL 50 mg b i d   A1c level 6 2%, lipid profile is normal, TSH is normal, magnesium is normal  Pending echocardiogram

## 2020-08-24 NOTE — UTILIZATION REVIEW
Initial Clinical Review    Admission: Date/Time/Statement:   Admission Orders (From admission, onward)     Ordered        08/22/20 1642  Inpatient Admission  Once                   Orders Placed This Encounter   Procedures    Inpatient Admission     Standing Status:   Standing     Number of Occurrences:   1     Order Specific Question:   Admitting Physician     Answer:   Sharon Colby [[id number]]     Order Specific Question:   Level of Care     Answer:   Level 2 Stepdown / HOT [14]     Order Specific Question:   Estimated length of stay     Answer:   More than 2 Midnights     Order Specific Question:   Certification     Answer:   I certify that inpatient services are medically necessary for this patient for a duration of greater than two midnights  See H&P and MD Progress Notes for additional information about the patient's course of treatment  ED Arrival Information     Expected Arrival Acuity Means of Arrival Escorted By Service Admission Type    - 8/22/2020 14:15 Emergent [facility]In [facility] Hospitalist Emergency    Arrival Complaint    weakness, ankles swollen, sweating        Chief Complaint   Patient presents with    Chest Pain     Patient states has off and on right sided CP x 1 week  Denies N/V, SOB     Assessment/Plan: 76year old male to the ED from home with complaints of chest pain, feeling generally weak and tired worsening over a period of a week  Admitted to inpatient ICU Step down level of care for new onset afib, sepsis due to pneumonia, hyponatremia  He arrives to the ED with rhonchi, irregularly irregular rhythym, hypothermic, tachycardic,  Tachypneic  EKG reveals new onset afib with RVR  GIven IV Cardizem bolus and started in drip in the ED  CXR reveals RLL pneumonia  Unknown etiology  Blood cultures pending  IV fluids and abx started in the ED  COVID neg  Started on ELiquis  Check ECHO, cardiology consult  WBC 16 13   Elevated creat could be secondary to sepsis  S/P IV fluids   No baseline available  REcheck electrolytes  8/23 UPdate: Heart rate trending down  Wean Cardizem drip to off, start Cardizem po  IV lopressor as needed  COnsider digoxin after correcting potassium  Sodium level improving  Potassium 3 3  Supplement and recheck  COntinue IV abx  Procal elevated     ED Triage Vitals   Temperature Pulse Respirations Blood Pressure SpO2   08/22/20 1432 08/22/20 1432 08/22/20 1432 08/22/20 1432 08/22/20 1432   (!) 95 6 °F (35 3 °C) (!) 164 (!) 28 134/98 96 %      Temp Source Heart Rate Source Patient Position - Orthostatic VS BP Location FiO2 (%)   08/22/20 1432 08/22/20 1432 08/22/20 1432 08/22/20 1432 --   Oral Monitor Lying Right arm       Pain Score       08/22/20 1651       2          Wt Readings from Last 1 Encounters:   08/24/20 70 4 kg (155 lb 3 3 oz)     Additional Vital Signs:   /Time  Temp  Pulse  Resp  BP  MAP (mmHg)  SpO2   O2 Device  Patient Position - Orthostatic VS    08/23/20 2059  98 4 °F (36 9 °C)  103  22  115/76  92  95 %   None (Room air)  Lying    08/23/20 2000    80    132/70  95  96 %   None (Room air)  Lying    08/23/20 1637    120Abnormal                   08/23/20 1600  98 5 °F (36 9 °C)  119Abnormal    27Abnormal    113/65  86  96 %   None (Room air)  Sitting    08/23/20 1200  98 7 °F (37 1 °C)  96  26Abnormal        96 %   None (Room air)      08/23/20 1000    82  26Abnormal    108/63  81  96 %         08/23/20 0800  97 6 °F (36 4 °C)  121Abnormal    26Abnormal    108/78  88  95 %   None (Room air)  Lying    08/23/20 0600    108Abnormal    23Abnormal    109/70  82  96 %         08/23/20 0400  98 °F (36 7 °C)  115Abnormal    23Abnormal    100/73  84  95 %   None (Room air)  Lying    08/23/20 0300    115Abnormal    23Abnormal    100/68  78  97 %         08/23/20 0100    120Abnormal    23Abnormal    110/75  87  97 %         08/23/20 0000  97 6 °F (36 4 °C)  90  24Abnormal    101/65  75  97 %   None (Room air)  Lying    08/22/20 2300   82  25Abnormal    97/72  80  97 %         08/22/20 2158    121Abnormal    21      97 %         08/22/20 1651    131Abnormal    18  103/67    98 %   None (Room air)  Lying    08/22/20 1645    140Abnormal    24Abnormal    103/67  82  97 %         08/22/20 1530    144Abnormal    18  123/93    98 %   None (Room air)  Lying    08/22/20 1514    155Abnormal    18  137/86    99 %   None (Room air)      08/22/20 1459    145Abnormal    28Abnormal    129/77    98 %   None (Room air)  Lying    08/22/20 1452    158Abnormal    26Abnormal    127/80    97 %   None (Room air)  Lying    08/22/20 1432  95 6 °F (35 3 °C)Abnormal    164Abnormal    28Abnormal    134/98    96 %   None (Room air)  Lying       Weights (last 14 days)   Pertinent Labs/Diagnostic Test Results:   8/22 EKG: Atrial fibrillation with rapid ventricular response with premature ventricular or aberrantly conducted complexes  Rightward axis  Abnormal ECG  No previous ECGs available  8/22 CXR:   Patchy right lower lung zone airspace opacity suspicious for pneumonia       Results from last 7 days   Lab Units 08/22/20  1603   SARS-COV-2  Negative     Results from last 7 days   Lab Units 08/23/20  0522 08/22/20  1454   WBC Thousand/uL 12 85* 16 13*   HEMOGLOBIN g/dL 13 4 16 1   HEMATOCRIT % 39 4 46 6   PLATELETS Thousands/uL 195 239   BANDS PCT %  --  1         Results from last 7 days   Lab Units 08/23/20  1826 08/23/20  0522 08/22/20  1859 08/22/20  1454   SODIUM mmol/L 129* 133* 130* 127*   POTASSIUM mmol/L 4 4 3 3* 3 3* 3 2*   CHLORIDE mmol/L 97* 98* 96* 89*   CO2 mmol/L 24 27 24 29   ANION GAP mmol/L 8 8 10 9   BUN mg/dL 16 14 16 17   CREATININE mg/dL 1 12 0 99 1 15 1 58*   EGFR ml/min/1 73sq m 68 79 66 45   CALCIUM mg/dL 7 7* 7 5* 7 9* 8 5   MAGNESIUM mg/dL  --   --  2 0  --      Results from last 7 days   Lab Units 08/23/20  0522 08/22/20  1454   AST U/L 51* 54*   ALT U/L 31 45   ALK PHOS U/L 142* 202*   TOTAL PROTEIN g/dL 5 5* 7 3   ALBUMIN g/dL 1 7* 2 7*   TOTAL BILIRUBIN mg/dL 0 39 0 81         Results from last 7 days   Lab Units 08/23/20  1826 08/23/20  0522 08/22/20  1859 08/22/20  1454   GLUCOSE RANDOM mg/dL 153* 108 130 138         Results from last 7 days   Lab Units 08/22/20  1454   HEMOGLOBIN A1C % 6 2*   EAG mg/dl 131       Results from last 7 days   Lab Units 08/22/20  1454   TROPONIN I ng/mL <0 02             Results from last 7 days   Lab Units 08/22/20  1859   TSH 3RD GENERATON uIU/mL 2 823     Results from last 7 days   Lab Units 08/23/20  0522 08/22/20  1725   PROCALCITONIN ng/ml 0 48* 0 64*     Results from last 7 days   Lab Units 08/22/20  1603   LACTIC ACID mmol/L 1 5       Results from last 7 days   Lab Units 08/22/20  1859   HEP C AB  Non-reactive                     Results from last 7 days   Lab Units 08/22/20  1727   STREP PNEUMONIAE ANTIGEN, URINE  Negative   LEGIONELLA URINARY ANTIGEN  Negative       Results from last 7 days   Lab Units 08/22/20  1454   ETHANOL LVL mg/dL <3       Results from last 7 days   Lab Units 08/22/20  1603   BLOOD CULTURE  No Growth at 24 hrs  No Growth at 24 hrs       Results from last 7 days   Lab Units 08/22/20  1454   TOTAL COUNTED  100       ED Treatment:   Medication Administration from 08/22/2020 1414 to 08/22/2020 1812       Date/Time Order Dose Route Action     08/22/2020 1449 diltiazem (CARDIZEM) injection 20 mg 20 mg Intravenous Given     08/22/2020 1739 sodium chloride 0 9 % infusion 125 mL/hr Intravenous New Bag     08/22/2020 1449 sodium chloride 0 9 % infusion 125 mL/hr Intravenous New Bag     08/22/2020 1625 sodium chloride 0 9 % bolus 1,000 mL 1,000 mL Intravenous New Bag     08/22/2020 1700 sodium chloride 0 9 % bolus 1,000 mL 1,000 mL Intravenous New Bag     08/22/2020 1629 cefTRIAXone (ROCEPHIN) IVPB (premix) 2,000 mg 50 mL 2,000 mg Intravenous New Bag     08/22/2020 1631 diltiazem (CARDIZEM) injection 30 mg 30 mg Intravenous Given     08/22/2020 1706 azithromycin (ZITHROMAX) 500 mg in sodium chloride 0 9 % 250 mL IVPB 500 mg Intravenous New Bag     08/22/2020 1709 apixaban (ELIQUIS) tablet 5 mg 5 mg Oral Given     08/22/2020 1701 metoprolol (LOPRESSOR) injection 5 mg 5 mg Intravenous Given        Past Medical History:   Diagnosis Date    Hypertension      Admitting Diagnosis: Chest pain [R07 9]  Weakness [R53 1]  New onset atrial fibrillation (HCC) [I48 91]  Right lower lobe pneumonia [J18 9]  Sepsis ([facility] [address] ) [A41 9]  Age/Sex: 76 y o  male  Admission Orders:  TEle  UP and OOB  SCDs  ECHO  Elevate HOB  Scheduled Medications:  apixaban, 5 mg, Oral, BID  cefTRIAXone, 1,000 mg, Intravenous, Q24H  digoxin, 125 mcg, Intravenous, Once  digoxin, 250 mcg, Intravenous, Once  digoxin, 500 mcg, Intravenous, Once  [START ON 8/25/2020] digoxin, 125 mcg, Oral, Daily  docusate sodium, 100 mg, Oral, BID  guaiFENesin, 600 mg, Oral, BID  magnesium oxide, 400 mg, Oral, BID  metoprolol succinate, 50 mg, Oral, BID  nicotine, 21 mg, Transdermal, Daily      Continuous IV Infusions:     PRN Meds:  metoprolol, 5 mg, Intravenous, Q6H PRN  ondansetron, 4 mg, Intravenous, Q6H PRN  oxyCODONE-acetaminophen, 1 tablet, Oral, Q4H PRN  pneumococcal 13-valent conjugate vaccine, 0 5 mL, Intramuscular, Prior to discharge        IP CONSULT TO CARDIOLOGY    Network Utilization Review Department  [email or web address]  ATTENTION: Please call with any questions or concerns to [phone] and carefully listen to the prompts so that you are directed to the right person  All voicemails are confidential   Charmaine Colón all requests for admission clinical reviews, approved or denied determinations and any other requests to dedicated fax number below belonging to the campus where the patient is receiving treatment   List of dedicated fax numbers for the Facilities:  [address] DENIALS (Administrative/Medical Necessity) [phone]   [address] (Maternity/NICU/Pediatrics) [phone] Haydee Martin [phone]   Glory Rast [phone]   Marissa Becerril [phone]   [address] [phone]   Jojo De Anda [phone]   [address] [facility]  [address] [facility] [address] [facility] [phone]

## 2020-08-24 NOTE — CASE MANAGEMENT
Anticipate Eliquis for dc:  Faxed prescription to [facility] for pricing of Eliquis  3:40 Price of Eliquis is $47 per month  CM spoke to patient and daughter about new PCP and appointment date and time and the cost of Eliquis- Patient is agreeable to cost of Eliquis- will provided him with a 0 copay for 1 month

## 2020-08-24 NOTE — ASSESSMENT & PLAN NOTE
Unknown etiology  Patient received azithromycin and ceftriaxone in the ER-will continue ceftriaxone only  Blood culture shows no growth  Urine Legionella, pneumonia negative-DC azithromycin    Procalcitonin is 0 64  Lactic acid is level is normal  COVID negative  Follow respiratory protocol

## 2020-08-25 PROBLEM — I50.21 ACUTE SYSTOLIC CHF (CONGESTIVE HEART FAILURE) (HCC): Status: ACTIVE | Noted: 2020-01-01

## 2020-08-25 NOTE — PROGRESS NOTES
Progress Note - Cardiology   Morales Lynch 76 y o  male MRN: [id number]  Unit/Bed#: -01 Encounter: [id number]    Assessment:  Sepsis secondary to pneumonia  Tobacco use disorder  afib with rvr- on eliquis, yesterday started on digoxin  Peripheral edema- likely HF related  New mildly reduced EF- likely related to tachy induced cardiomyopathy    Plan:  1  Patient to receive BB, Magnesium and eliquis now  2  Proceed with HUNG cardioversion today  3  Will stop digoxin   4  Continue BB and eliquis post procedure  5  Will need to fu with us as an outpatient to determine afib burden  6  Continue to treat pneumonia, pt is symptomatically improving    Subjective/Objective     Subjective: Patient reports overall feeling better  He has no cp or sob  He does not feel his afib  He has no palpitations  He reports no concerns at this time  Objective: yesterday was seen for new onset afib in the setting of sepsis pneumonia  His HR was uncontrolled with BB and cardizem  He was started on digoxin  Echo was read showing new mildly reduced EF  Decision was made to proceed with HUNG cardioversion today   HR slightly better controlled in the 90s now but pt is still in afib    Vitals: /93 (BP Location: Left arm)   Pulse 95   Temp 97 7 °F (36 5 °C) (Oral)   Resp (!) 25   Ht 5' 6" (1 676 m)   Wt 71 8 kg (158 lb 4 6 oz)   SpO2 94%   BMI 25 55 kg/m²   Vitals:    08/24/20 0557 08/25/20 0400   Weight: 70 4 kg (155 lb 3 3 oz) 71 8 kg (158 lb 4 6 oz)     Orthostatic Blood Pressures      Most Recent Value   Blood Pressure  131/93 filed at 08/25/2020 0700   Patient Position - Orthostatic VS  Sitting filed at 08/25/2020 0700            Intake/Output Summary (Last 24 hours) at 8/25/2020 0748  Last data filed at 8/24/2020 1625  Gross per 24 hour   Intake 370 ml   Output    Net 370 ml       Invasive Devices     Peripheral Intravenous Line            Peripheral IV 08/22/20 Left Antecubital 2 days    Peripheral IV 08/22/20 Right Forearm 2 days                  Physical Exam:  Physical Exam  Constitutional:       Appearance: Normal appearance  HENT:      Head: Normocephalic and atraumatic  Cardiovascular:      Rate and Rhythm: Rhythm irregular  Heart sounds: No murmur  No friction rub  No gallop  Pulmonary:      Effort: Pulmonary effort is normal  No respiratory distress  Breath sounds: No wheezing  Comments: Distant breath sounds  Abdominal:      General: Abdomen is flat  There is no distension  Musculoskeletal:         General: Swelling present  No tenderness  Skin:     General: Skin is warm and dry  Capillary Refill: Capillary refill takes less than 2 seconds  Neurological:      General: No focal deficit present  Mental Status: He is alert and oriented to person, place, and time  Psychiatric:         Mood and Affect: Mood normal        Lab Results:   I have personally reviewed pertinent lab results      CBC with diff:   Results from last 7 days   Lab Units 08/25/20  0452   WBC Thousand/uL 15 07*   RBC Million/uL 4 96   HEMOGLOBIN g/dL 14 4   HEMATOCRIT % 42 0   MCV fL 85   MCH pg 29 0   MCHC g/dL 34 3   RDW % 13 4   MPV fL 10 8   PLATELETS Thousands/uL 254     CMP:   Results from last 7 days   Lab Units 08/25/20  0451   SODIUM mmol/L 132*   POTASSIUM mmol/L 4 2   CHLORIDE mmol/L 98*   CO2 mmol/L 27   BUN mg/dL 16   CREATININE mg/dL 0 88   CALCIUM mg/dL 7 8*   AST U/L 95*   ALT U/L 63   ALK PHOS U/L 270*   EGFR ml/min/1 73sq m 90     Troponin:   0   Lab Value Date/Time    TROPONINI <0 02 08/22/2020 1454     BNP:   Results from last 7 days   Lab Units 08/25/20  0451   POTASSIUM mmol/L 4 2   CHLORIDE mmol/L 98*   CO2 mmol/L 27   BUN mg/dL 16   CREATININE mg/dL 0 88   CALCIUM mg/dL 7 8*   EGFR ml/min/1 73sq m 90     Coags:     TSH:   Results from last 7 days   Lab Units 08/22/20  1859   TSH 3RD GENERATON uIU/mL 2 823     Magnesium:   Results from last 7 days   Lab Units 08/22/20  1859   MAGNESIUM mg/dL 2 0     Imaging: I have personally reviewed pertinent reports  ECHO 8/24/20:  LEFT VENTRICLE:  Accurate assessment of LVEF and wall motion are limited by beat to beat variation due to atrial fibrillation  Systolic function was normal  Ejection fraction was estimated in the range of 40 % to 45 %  Although no diagnostic regional wall motion abnormality was identified, this possibility cannot be completely excluded on the basis of this study  MITRAL VALVE:  There was mild regurgitation      EKG:   Atrial fibrillation with rapid ventricular response with premature ventricular or aberrantly conducted complexes  Rightward axis  Abnormal ECG  No previous ECGs available  Confirmed by Merritt Daniel ([id number]) on 8/24/2020 7:37:14 AM

## 2020-08-25 NOTE — PROGRESS NOTES
Progress Note - Maritza Hernández 1955, 76 y o  male MRN: [id number]    Unit/Bed#: -01 Encounter: [id number]    Primary Care Provider: Zaira Amos DO   Date and time admitted to hospital: 8/22/2020  2:27 PM    * Sepsis due to pneumonia [facility])  Assessment & Plan  Admitted for sepsis 2/2 pneumonia  Sepsis POA, currently improving    Pneumonia of right lower lobe due to infectious organism  Assessment & Plan  Continue IV antibiotics for the next 24-48 hours  Repeat chest x-ray shows mild worsening, patient however clinically stable  Continue to monitor WBC, procalcitonin  Will continue IV antibiotics for the next 24-48 hours and hopefully transition to oral medications    Acute systolic CHF (congestive heart failure) ([address] )  Assessment & Plan  Wt Readings from Last 3 Encounters:   08/25/20 71 8 kg (158 lb 4 6 oz)     HFrEF most likely 2/2 tachyarrhythmia related CM  Repeat echocardiogram as an outpatient once sinus rhythm maintain  Mildly hypervolemic, continue IV diuretic as per cardiology    Hyponatremia  POA, improving with diuresis    ANTHONY  Present on admission  Improved     New onset a-fib ([address] )  Assessment & Plan  Successful cardioversion  Continue anticoagulation and metoprolol  Mild hypervolemia/hyponatremia, EF 40-45%, may need to repeat once sinus rhythm maintained  Continue IV diuresis for now and reassess tomorrow    VTE Pharmacologic Prophylaxis:   Pharmacologic: Heparin    Patient Centered Rounds: I have performed bedside rounds with nursing staff today    Discussions with Specialists or Other Care Team Provider:     Education and Discussions with Family / Patient:     Current Length of Stay: 3 day(s)    Current Patient Status: Inpatient   Certification Statement: The patient will continue to require additional inpatient hospital stay due to Pneumonia    Discharge Plan:  Probably tomorrow    Code Status: Level 1 - Full Code      Subjective:   No complaints    Objective:     Vitals:   Temp (24hrs), Av °F (36 7 °C), Min:96 7 °F (35 9 °C), Max:99 °F (37 2 °C)    Temp:  [96 7 °F (35 9 °C)-99 °F (37 2 °C)] 97 4 °F (36 3 °C)  HR:  [] 72  Resp:  [20-32] 26  BP: ()/(67-97) 133/84  SpO2:  [94 %-100 %] 96 %  Body mass index is 25 55 kg/m²  Input and Output Summary (last 24 hours): Intake/Output Summary (Last 24 hours) at 2020 1440  Last data filed at 2020 1131  Gross per 24 hour   Intake 280 ml   Output 925 ml   Net -645 ml       Physical Exam:     General appearance: alert, appears stated age and cooperative  Head: Normocephalic, without obvious abnormality, atraumatic  Lungs: clear to auscultation bilaterally  Heart: regular rate and rhythm  Abdomen: Prolonged expiration bilaterally  Back: negative  Extremities: extremities atraumatic, no cyanosis or edema  Neurologic: Grossly normal    Additional Data:     Labs:    Results from last 7 days   Lab Units 20  0452  20  1454   WBC Thousand/uL 15 07*   < > 16 13*   HEMOGLOBIN g/dL 14 4   < > 16 1   HEMATOCRIT % 42 0   < > 46 6   PLATELETS Thousands/uL 254   < > 239   BANDS PCT %  --   --  1   LYMPHO PCT %  --   --  10*   MONO PCT %  --   --  6   EOS PCT %  --   --  0    < > = values in this interval not displayed  Results from last 7 days   Lab Units 20  0451   SODIUM mmol/L 132*   POTASSIUM mmol/L 4 2   CHLORIDE mmol/L 98*   CO2 mmol/L 27   BUN mg/dL 16   CREATININE mg/dL 0 88   ANION GAP mmol/L 7   CALCIUM mg/dL 7 8*   ALBUMIN g/dL 1 9*   TOTAL BILIRUBIN mg/dL 0 31   ALK PHOS U/L 270*   ALT U/L 63   AST U/L 95*   GLUCOSE RANDOM mg/dL 112             Results from last 7 days   Lab Units 20  1454   HEMOGLOBIN A1C % 6 2*     Results from last 7 days   Lab Units 20  0522 20  1725 20  1603   LACTIC ACID mmol/L  --   --  1 5   PROCALCITONIN ng/ml 0 48* 0 64*  --            * I Have Reviewed All Lab Data Listed Above  * Additional Pertinent Lab Tests Reviewed:  All Labs For Current Hospital Admission Reviewed    Imaging:    Imaging Reports Reviewed Today Include: images reviewed    Recent Cultures (last 7 days):     Results from last 7 days   Lab Units 08/22/20  1727 08/22/20  1603   BLOOD CULTURE   --  No Growth at 48 hrs  No Growth at 48 hrs  LEGIONELLA URINARY ANTIGEN  Negative  --        Last 24 Hours Medication List:   Current Facility-Administered Medications   Medication Dose Route Frequency Provider Last Rate    apixaban  5 mg Oral BID Ciera Carlton MD      docusate sodium  100 mg Oral BID Ciera Carlton MD      furosemide  40 mg Intravenous BID (diuretic) [place] MELISSA Ward      guaiFENesin  600 mg Oral BID Ciera Carlton MD      magnesium oxide  400 mg Oral BID Ciera Carlton MD      metoprolol  5 mg Intravenous Q6H PRN Ciera Carlton MD      metoprolol succinate  50 mg Oral BID [place], PA-C      nicotine  21 mg Transdermal Daily Ciera Carlton MD      ondansetron  4 mg Intravenous Q6H PRN Ciera Carlton MD      oxyCODONE-acetaminophen  1 tablet Oral Q4H PRN Ciera Carlton MD      piperacillin-tazobactam  3 375 g Intravenous Q6H Amanda Ware MD Stopped (08/25/20 1131)    pneumococcal 13-valent conjugate vaccine  0 5 mL Intramuscular Prior to discharge Ciera Carlton MD          Today, Patient Was Seen By: Amanda Ware MD    ** Please Note: Dictation voice to text software may have been used in the creation of this document   **

## 2020-08-25 NOTE — ASSESSMENT & PLAN NOTE
Continue IV antibiotics for the next 24-48 hours  Repeat chest x-ray shows mild worsening, patient however clinically stable  Will continue IV antibiotics for the next 24-48 hours and hopefully transition to oral medications

## 2020-08-25 NOTE — ASSESSMENT & PLAN NOTE
Successful cardioversion  Continue anticoagulation and metoprolol  Mild hypervolemia/hyponatremia, EF 40-45%, may need to repeat once sinus rhythm maintained  Continue IV diuresis for now and reassess tomorrow

## 2020-08-25 NOTE — PROCEDURES
OPERATIVE REPORT  PATIENT NAME: Garfield Ontiveros    :  1955  MRN: [id number]  Pt Location: [place] [address]    Procedure DATE:     Transesophageal echocardiogram and cardioversion    Preop Diagnosis:  Atrial fibrillation, cardiomyopathy      Anesthesia Type:   Propofol by Anesthesia    Operative Indications:  Atrial fibrillation      Operative Findings:  Mildly reduced LV function, no clot noted, mild MR    Complications:   None  No intracardiac thrombus seen  Successful cardioversion with a single 200 joule shock and normal sinus rhythm was restored  Patient was on Eliquis therapy      Patient Disposition:  hemodynamically stable    Developed post  Procedure    SIGNATURE: Jael Galvez DO  DATE: 2020  TIME: 9:38 AM

## 2020-08-25 NOTE — PLAN OF CARE
Problem: NEUROSENSORY - ADULT  Goal: Achieves stable or improved neurological status  Description: INTERVENTIONS  - Monitor and report changes in neurological status  - Monitor vital signs such as temperature, blood pressure, glucose, and any other labs ordered   - Initiate measures to prevent increased intracranial pressure  - Monitor for seizure activity and implement precautions if appropriate      Outcome: Progressing  Goal: Achieves maximal functionality and self care  Description: INTERVENTIONS  - Monitor swallowing and airway patency with patient fatigue and changes in neurological status  - Encourage and assist patient to increase activity and self care     - Encourage visually impaired, hearing impaired and aphasic patients to use assistive/communication devices  Outcome: Progressing     Problem: CARDIOVASCULAR - ADULT  Goal: Maintains optimal cardiac output and hemodynamic stability  Description: INTERVENTIONS:  - Monitor I/O, vital signs and rhythm  - Monitor for S/S and trends of decreased cardiac output  - Administer and titrate ordered vasoactive medications to optimize hemodynamic stability  - Assess quality of pulses, skin color and temperature  - Assess for signs of decreased coronary artery perfusion  - Instruct patient to report change in severity of symptoms  Outcome: Progressing  Goal: Absence of cardiac dysrhythmias or at baseline rhythm  Description: INTERVENTIONS:  - Continuous cardiac monitoring, vital signs, obtain 12 lead EKG if ordered  - Administer antiarrhythmic and heart rate control medications as ordered  - Monitor electrolytes and administer replacement therapy as ordered  Outcome: Progressing     Problem: RESPIRATORY - ADULT  Goal: Achieves optimal ventilation and oxygenation  Description: INTERVENTIONS:  - Assess for changes in respiratory status  - Assess for changes in mentation and behavior  - Position to facilitate oxygenation and minimize respiratory effort  - Oxygen administered by appropriate delivery if ordered  - Initiate smoking cessation education as indicated  - Encourage broncho-pulmonary hygiene including cough, deep breathe, Incentive Spirometry  - Assess the need for suctioning and aspirate as needed  - Assess and instruct to report SOB or any respiratory difficulty  - Respiratory Therapy support as indicated  Outcome: Progressing     Problem: GASTROINTESTINAL - ADULT  Goal: Minimal or absence of nausea and/or vomiting  Description: INTERVENTIONS:  - Administer IV fluids if ordered to ensure adequate hydration  - Maintain NPO status until nausea and vomiting are resolved  - Nasogastric tube if ordered  - Administer ordered antiemetic medications as needed  - Provide nonpharmacologic comfort measures as appropriate  - Advance diet as tolerated, if ordered  - Consider nutrition services referral to assist patient with adequate nutrition and appropriate food choices  Outcome: Progressing  Goal: Maintains or returns to baseline bowel function  Description: INTERVENTIONS:  - Assess bowel function  - Encourage oral fluids to ensure adequate hydration  - Administer IV fluids if ordered to ensure adequate hydration  - Administer ordered medications as needed  - Encourage mobilization and activity  - Consider nutritional services referral to assist patient with adequate nutrition and appropriate food choices  Outcome: Progressing  Goal: Maintains adequate nutritional intake  Description: INTERVENTIONS:  - Monitor percentage of each meal consumed  - Identify factors contributing to decreased intake, treat as appropriate  - Assist with meals as needed  - Monitor I&O, weight, and lab values if indicated  - Obtain nutrition services referral as needed  Outcome: Progressing     Problem: GENITOURINARY - ADULT  Goal: Maintains or returns to baseline urinary function  Description: INTERVENTIONS:  - Assess urinary function  - Encourage oral fluids to ensure adequate hydration if ordered  - Administer IV fluids as ordered to ensure adequate hydration  - Administer ordered medications as needed  - Offer frequent toileting  - Follow urinary retention protocol if ordered  Outcome: Progressing     Problem: METABOLIC, FLUID AND ELECTROLYTES - ADULT  Goal: Electrolytes maintained within normal limits  Description: INTERVENTIONS:  - Monitor labs and assess patient for signs and symptoms of electrolyte imbalances  - Administer electrolyte replacement as ordered  - Monitor response to electrolyte replacements, including repeat lab results as appropriate  - Instruct patient on fluid and nutrition as appropriate  Outcome: Progressing  Goal: Fluid balance maintained  Description: INTERVENTIONS:  - Monitor labs   - Monitor I/O and WT  - Instruct patient on fluid and nutrition as appropriate  - Assess for signs & symptoms of volume excess or deficit  Outcome: Progressing  Goal: Glucose maintained within target range  Description: INTERVENTIONS:  - Monitor Blood Glucose as ordered  - Assess for signs and symptoms of hyperglycemia and hypoglycemia  - Administer ordered medications to maintain glucose within target range  - Assess nutritional intake and initiate nutrition service referral as needed  Outcome: Progressing     Problem: SKIN/TISSUE INTEGRITY - ADULT  Goal: Skin integrity remains intact  Description: INTERVENTIONS  - Identify patients at risk for skin breakdown  - Assess and monitor skin integrity  - Assess and monitor nutrition and hydration status  - Monitor labs (i e  albumin)  - Assess for incontinence   - Turn and reposition patient  - Assist with mobility/ambulation  - Relieve pressure over bony prominences  - Avoid friction and shearing  - Provide appropriate hygiene as needed including keeping skin clean and dry  - Evaluate need for skin moisturizer/barrier cream  - Collaborate with interdisciplinary team (i e  Nutrition, Rehabilitation, etc )   - Patient/family teaching  Outcome: Progressing  Goal: Oral mucous membranes remain intact  Description: INTERVENTIONS  - Assess oral mucosa and hygiene practices  - Implement preventative oral hygiene regimen  - Implement oral medicated treatments as ordered  - Initiate Nutrition services referral as needed  Outcome: Progressing     Problem: HEMATOLOGIC - ADULT  Goal: Maintains hematologic stability  Description: INTERVENTIONS  - Assess for signs and symptoms of bleeding or hemorrhage  - Monitor labs  - Administer supportive blood products/factors as ordered and appropriate  Outcome: Progressing     Problem: MUSCULOSKELETAL - ADULT  Goal: Maintain or return mobility to safest level of function  Description: INTERVENTIONS:  - Assess patient's ability to carry out ADLs; assess patient's baseline for ADL function and identify physical deficits which impact ability to perform ADLs (bathing, care of mouth/teeth, toileting, grooming, dressing, etc )  - Assess/evaluate cause of self-care deficits   - Assess range of motion  - Assess patient's mobility  - Assess patient's need for assistive devices and provide as appropriate  - Encourage maximum independence but intervene and supervise when necessary  - Involve family in performance of ADLs  - Assess for home care needs following discharge   - Consider OT consult to assist with ADL evaluation and planning for discharge  - Provide patient education as appropriate  Outcome: Progressing     Problem: Potential for Falls  Goal: Patient will remain free of falls  Description: INTERVENTIONS:  - Assess patient frequently for physical needs  -  Identify cognitive and physical deficits and behaviors that affect risk of falls    -  [place] fall precautions as indicated by assessment   - Educate patient/family on patient safety including physical limitations  - Instruct patient to call for assistance with activity based on assessment  - Modify environment to reduce risk of injury  - Consider OT/PT consult to assist with strengthening/mobility  Outcome: Progressing

## 2020-08-25 NOTE — ASSESSMENT & PLAN NOTE
Wt Readings from Last 3 Encounters:   08/25/20 71 8 kg (158 lb 4 6 oz)     HFrEF most likely 2/2 tachyarrhythmia related CM  Repeat echocardiogram as an outpatient once sinus rhythm maintain  Mildly hypervolemic, continue IV diuretic as per cardiology

## 2020-08-25 NOTE — CASE MANAGEMENT
Discussed in huddle: plan is cardioversion today  DC plan is home  Reviewed CM was unaware of discharge today  CM spoke to patient at the bedside, reviewed DC IMM with patient and informed that patient can stay an additional 4 hours for reconsidering appealing the discharge as the medicare rights were review on the day of discharge  Pt verbalized understanding and feels ready to go home and does not intend to stay 4 hours to reconsider  PCP appointment was mde  No other needs identified

## 2020-08-25 NOTE — ANESTHESIA PREPROCEDURE EVALUATION
Procedure:  HUNG  CARDIOVERSION (NON INVASIVE ONLY)    Relevant Problems   ANESTHESIA (within normal limits)      CARDIO   (+) Hypertension   (+) New onset a-fib (HCC)      ENDO (within normal limits)      GI/HEPATIC (within normal limits)      /RENAL (within normal limits)      GYN (within normal limits)      HEMATOLOGY (within normal limits)      MUSCULOSKELETAL (within normal limits)      NEURO/PSYCH (within normal limits)      PULMONARY   (+) Pneumonia of right lower lobe due to infectious organism      Other   (+) Elevated serum creatinine   (+) Sepsis due to pneumonia [facility])      Recent Results (from the past 62801 hour(s))   Echo complete with contrast if indicated    [facility]    Archetypes [place] [id number]    Transthoracic Echocardiogram  2D, M-mode, Doppler, and Color Doppler    Study date:  24-Aug-2020    Patient: Nate Barber  MR number: [id number]  Account number: [de-identified]  : 1955  Age: 76 years  Gender: Male  Status: Inpatient  Location: [facility] Echo Lab  Height: 66 in  Weight: 155 lb  BP: 105/ 71 mmHg    Indications: Atrial Fibrillation    Diagnoses: I48 0 - Atrial fibrillation    Sonographer:  TERESA Dietrich  Primary Physician:  Andreas Kerr DO  Referring Physician:  Lili Riggs MD  Group:  [address] [place] [address]  Interpreting Physician: Chantal Dewitt MD    SUMMARY    LEFT VENTRICLE:  Accurate assessment of LVEF and wall motion are limited by beat to beat variation due to atrial fibrillation  Systolic function was normal  Ejection fraction was estimated in the range of 40 % to 45 %  Although no diagnostic regional wall motion abnormality was identified, this possibility cannot be completely excluded on the basis of this study  MITRAL VALVE:  There was mild regurgitation  PROCEDURE: The study was performed in the [address] [place] [address] Echo Lab  This was a routine study  The transthoracic approach was used   The study included complete 2D imaging, M-mode, complete spectral Doppler, and color Doppler  The heart  rate was 121 bpm, at the start of the study  Images were obtained from the parasternal, apical, subcostal, and suprasternal notch acoustic windows  Echocardiographic views were limited due to lung interference  Image quality was adequate  LEFT VENTRICLE: Accurate assessment of LVEF and wall motion are limited by beat to beat variation due to atrial fibrillation  Size was normal  Systolic function was normal  Ejection fraction was estimated in the range of 40 % to 45 %  Although no diagnostic regional wall motion abnormality was identified, this possibility cannot be completely excluded on the basis of this study  Wall thickness was normal  DOPPLER: The study was not technically sufficient to allow  evaluation of LV diastolic function  RIGHT VENTRICLE: The size was normal  Systolic function was normal  Wall thickness was normal     LEFT ATRIUM: Size was normal     ATRIAL SEPTUM: No defect or patent foramen ovale was identified  RIGHT ATRIUM: Size was normal     MITRAL VALVE: DOPPLER: There was no evidence for stenosis  There was mild regurgitation  AORTIC VALVE: The valve was trileaflet  Leaflets exhibited normal thickness and normal cuspal separation  DOPPLER: Transaortic velocity was within the normal range  There was no evidence for stenosis  There was no regurgitation  TRICUSPID VALVE: DOPPLER: There was no evidence for stenosis  There was trace regurgitation  PULMONIC VALVE: DOPPLER: There was no evidence for stenosis  There was no regurgitation  PERICARDIUM: There was no pericardial effusion  AORTA: The root exhibited normal size  SYSTEMIC VEINS: IVC: The inferior vena cava was normal in size and course   Respirophasic changes were normal     SYSTEM MEASUREMENT TABLES    2D  %FS: 33 19 %  AV Diam: 3 08 cm  EDV(Teich): 94 76 ml  EF(Teich): 61 9 %  ESV(Teich): 36 1 ml  IVSd: 0 88 cm  LA Diam: 3 95 cm  LAAs A4C: 18 17 cm2  LAESV A-L A4C: 44 52 ml  LAESV MOD A4C: 43 01 ml  LALs A4C: 6 29 cm  LVEDV MOD A4C: 101 72 ml  LVEF MOD A4C: 44 42 %  LVESV MOD A4C: 56 53 ml  LVIDd: 4 55 cm  LVIDs: 3 04 cm  LVLd A4C: 8 7 cm  LVLs A4C: 8 27 cm  LVPWd: 0 8 cm  RAAs A4C: 15 37 cm2  RAESV A-L: 41 52 ml  RAESV MOD: 39 54 ml  RALs: 4 83 cm  RVIDd: 3 05 cm  RWT: 0 35  SV MOD A4C: 45 18 ml  SV(Teich): 58 66 ml    MM  TAPSE: 1 75 cm    PW  E' Lat: 0 15 m/s  E' Sept: 0 1 m/s    [employer] Accredited Echocardiography Laboratory    Prepared and electronically signed by    Ernst Escoto MD  Signed 24-Aug-2020 14:30:14     ECG 12 lead   Result Value    Ventricular Rate 162    Atrial Rate 156    NJ Interval     QRSD Interval 86    QT Interval 292    QTC Interval 479    P Axis     QRS Axis 105    T Wave Axis -11    Narrative    Atrial fibrillation with rapid ventricular response with premature ventricular or aberrantly conducted complexes  Rightward axis  Abnormal ECG  No previous ECGs available  Confirmed by Alka Salas ([id number]) on 8/24/2020 7:37:14 AM     Recent Results (from the past 36 hour(s))   Comprehensive metabolic panel    Collection Time: 08/24/20  5:30 AM   Result Value Ref Range    Sodium 132 (L) 136 - 145 mmol/L    Potassium 4 2 3 5 - 5 3 mmol/L    Chloride 98 (L) 100 - 108 mmol/L    CO2 28 21 - 32 mmol/L    ANION GAP 6 4 - 13 mmol/L    BUN 13 5 - 25 mg/dL    Creatinine 0 84 0 60 - 1 30 mg/dL    Glucose 115 65 - 140 mg/dL    Calcium 7 9 (L) 8 3 - 10 1 mg/dL    AST 84 (H) 5 - 45 U/L    ALT 54 12 - 78 U/L    Alkaline Phosphatase 244 (H) 46 - 116 U/L    Total Protein 6 1 (L) 6 4 - 8 2 g/dL    Albumin 2 0 (L) 3 5 - 5 0 g/dL    Total Bilirubin 0 30 0 20 - 1 00 mg/dL    eGFR 91 ml/min/1 73sq m   Comprehensive metabolic panel    Collection Time: 08/25/20  4:51 AM   Result Value Ref Range    Sodium 132 (L) 136 - 145 mmol/L    Potassium 4 2 3 5 - 5 3 mmol/L    Chloride 98 (L) 100 - 108 mmol/L    CO2 27 21 - 32 mmol/L    ANION GAP 7 4 - 13 mmol/L    BUN 16 5 - 25 mg/dL    Creatinine 0 88 0 60 - 1 30 mg/dL    Glucose 112 65 - 140 mg/dL    Calcium 7 8 (L) 8 3 - 10 1 mg/dL    AST 95 (H) 5 - 45 U/L    ALT 63 12 - 78 U/L    Alkaline Phosphatase 270 (H) 46 - 116 U/L    Total Protein 5 9 (L) 6 4 - 8 2 g/dL    Albumin 1 9 (L) 3 5 - 5 0 g/dL    Total Bilirubin 0 31 0 20 - 1 00 mg/dL    eGFR 90 ml/min/1 73sq m   CBC and differential    Collection Time: 08/25/20  4:52 AM   Result Value Ref Range    WBC 15 07 (H) 4 31 - 10 16 Thousand/uL    RBC 4 96 3 88 - 5 62 Million/uL    Hemoglobin 14 4 12 0 - 17 0 g/dL    Hematocrit 42 0 36 5 - 49 3 %    MCV 85 82 - 98 fL    MCH 29 0 26 8 - 34 3 pg    MCHC 34 3 31 4 - 37 4 g/dL    RDW 13 4 11 6 - 15 1 %    MPV 10 8 8 9 - 12 7 fL    Platelets 804 946 - 674 Thousands/uL    nRBC 0 /100 WBCs     XR chest 1 view portable   ED Interpretation by Nacho Gomez DO (08/22 4822)   RLL infiltrate      Final Result by Cristina Nelson MD (08/22 1859)      Patchy right lower lung zone airspace opacity suspicious for pneumonia  Workstation performed: [id number]             Physical Exam    Airway    Mallampati score: II  TM Distance: >3 FB  Neck ROM: full     Dental       Cardiovascular  Rhythm: irregular, Rate: abnormal,     Pulmonary  Breath sounds clear to auscultation,     Other Findings        Anesthesia Plan  ASA Score- 3     Anesthesia Type- IV sedation with anesthesia with ASA Monitors  Additional Monitors:   Airway Plan:           Plan Factors-            Obstructive sleep apnea risk education given perioperatively  Induction-     Postoperative Plan-     Informed Consent- Anesthetic plan and risks discussed with patient

## 2020-08-26 NOTE — NURSING NOTE
Discharge instructions & prescriptions reviewed with pt & daughter at bedside/verbalized understanding  Pt ambulated to entrance/tolerated well

## 2020-08-26 NOTE — ASSESSMENT & PLAN NOTE
Successful cardioversion  Continue anticoagulation and metoprolol  Continue to follow with Cardiology as an outpatient

## 2020-08-26 NOTE — ASSESSMENT & PLAN NOTE
Treated with IV antibiotics and showed significant improvement  Continue Augmentin for 5 more days  Continue to follow with primary care physician as an outpatient within 3-5 days post discharge

## 2020-08-26 NOTE — PROGRESS NOTES
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, appearing clinically slightly hypervolemic  PFO noted on HUNG- requires no further intervention    Plan:  1  Continue BB and eliquis post procedure  2  Will need to fu with us as an outpatient to determine afib burden  3  Will need to recheck echocardiogram as an outpatient once in NSR to see if EF improves  4  Start lasix 40 po daily now and continue after discharge  5  will coordinate fu with our group as an outpatient  6  Continue to treat pneumonia, pt is symptomatically improving    Subjective/Objective     Subjective: patient reports breathing well despite coughing a lot in the exam room  Reports legs still swollen which he reports is new since this admission  He otherwise has no cardiac complaints  He wants to leave the hospital     Objective: patient is in NSR and did have a short run of SVT overnight  He did not notice symptoms  He otherwise had an uneventful night       Vitals: /83 (BP Location: Left arm)   Pulse 67   Temp 98 9 °F (37 2 °C) (Oral)   Resp 20   Ht 5' 6" (1 676 m)   Wt 69 8 kg (153 lb 14 1 oz)   SpO2 96%   BMI 24 84 kg/m²   Vitals:    08/25/20 0400 08/26/20 0455   Weight: 71 8 kg (158 lb 4 6 oz) 69 8 kg (153 lb 14 1 oz)     Orthostatic Blood Pressures      Most Recent Value   Blood Pressure  158/83 filed at 08/26/2020 0700   Patient Position - Orthostatic VS  Sitting filed at 08/26/2020 0700            Intake/Output Summary (Last 24 hours) at 8/26/2020 0838  Last data filed at 8/26/2020 0524  Gross per 24 hour   Intake 630 ml   Output 3300 ml   Net -2670 ml       Invasive Devices     Peripheral Intravenous Line            Peripheral IV 08/25/20 Left;Ventral (anterior) Forearm less than 1 day                  Physical Exam:  Physical Exam  Constitutional:       Appearance: Normal appearance  HENT:      Head: Normocephalic and atraumatic  Cardiovascular:      Rate and Rhythm: regular rhythm     Heart sounds: No murmur  No friction rub  No gallop  Pulmonary:      Effort: Pulmonary effort is normal  No respiratory distress  Breath sounds: No wheezing  Comments: Distant breath sounds  Abdominal:      General: Abdomen is flat  There is no distension  Musculoskeletal:         General: Swelling present  No tenderness  Skin:     General: Skin is warm and dry  Capillary Refill: Capillary refill takes less than 2 seconds  Neurological:      General: No focal deficit present  Mental Status: He is alert and oriented to person, place, and time  Psychiatric:         Mood and Affect: Mood normal        Lab Results:   I have personally reviewed pertinent lab results      CBC with diff:   Results from last 7 days   Lab Units 08/26/20  0439   WBC Thousand/uL 14 40*   RBC Million/uL 4 76   HEMOGLOBIN g/dL 13 7   HEMATOCRIT % 40 5   MCV fL 85   MCH pg 28 8   MCHC g/dL 33 8   RDW % 13 4   MPV fL 10 3   PLATELETS Thousands/uL 244     CMP:   Results from last 7 days   Lab Units 08/26/20  0439   SODIUM mmol/L 133*   POTASSIUM mmol/L 3 8   CHLORIDE mmol/L 97*   CO2 mmol/L 30   BUN mg/dL 15   CREATININE mg/dL 0 89   CALCIUM mg/dL 8 1*   AST U/L 109*   ALT U/L 75   ALK PHOS U/L 276*   EGFR ml/min/1 73sq m 89     Troponin:   0   Lab Value Date/Time    TROPONINI <0 02 08/22/2020 1454     BNP:   Results from last 7 days   Lab Units 08/26/20  0439   POTASSIUM mmol/L 3 8   CHLORIDE mmol/L 97*   CO2 mmol/L 30   BUN mg/dL 15   CREATININE mg/dL 0 89   CALCIUM mg/dL 8 1*   EGFR ml/min/1 73sq m 89     Coags:     TSH:   Results from last 7 days   Lab Units 08/22/20  1859   TSH 3RD GENERATON uIU/mL 2 823     Magnesium:   Results from last 7 days   Lab Units 08/25/20  1711   MAGNESIUM mg/dL 1 5*     Imaging: I have personally reviewed pertinent reports  ECHO 8/24/20:  LEFT VENTRICLE:  Accurate assessment of LVEF and wall motion are limited by beat to beat variation due to atrial fibrillation  Systolic function was normal  Ejection fraction was estimated in the range of 40 % to 45 %  Although no diagnostic regional wall motion abnormality was identified, this possibility cannot be completely excluded on the basis of this study  MITRAL VALVE:  There was mild regurgitation  8/25/20 Echo-:  LEFT VENTRICLE:  Ejection fraction was estimated in the range of 40 % to 45 %  There was mild diffuse hypokinesis  LEFT ATRIAL APPENDAGE:  No thrombus was identified  ATRIAL SEPTUM:  There was a probable small patent foramen ovale  There was mild residual shunting  A bubble study was performed  There was a mild right-to-left shunt  MITRAL VALVE:  There was mild regurgitation  TRICUSPID VALVE:  There was mild regurgitation      EKG:   Atrial fibrillation with rapid ventricular response with premature ventricular or aberrantly conducted complexes  Rightward axis  Abnormal ECG  No previous ECGs available  Confirmed by Jose Page ([id number]) on 8/24/2020 7:37:14 AM    On telemetry appearing in NSR with HR in the 70s

## 2020-08-26 NOTE — ASSESSMENT & PLAN NOTE
Wt Readings from Last 3 Encounters:   08/26/20 69 8 kg (153 lb 14 1 oz)     HFrEF most likely 2/2 tachyarrhythmia related CM  Repeat echocardiogram as an outpatient once sinus rhythm maintain  Mildly hypervolemic, continue IV diuretic as per cardiology  Continue Lasix 20 mg daily and potassium supplement  Check BMP within 3-5 days post discharge and follow with primary care physician

## 2020-08-26 NOTE — PLAN OF CARE
Problem: NEUROSENSORY - ADULT  Goal: Achieves stable or improved neurological status  Description: INTERVENTIONS  - Monitor and report changes in neurological status  - Monitor vital signs such as temperature, blood pressure, glucose, and any other labs ordered   - Initiate measures to prevent increased intracranial pressure  - Monitor for seizure activity and implement precautions if appropriate      Outcome: Progressing  Goal: Achieves maximal functionality and self care  Description: INTERVENTIONS  - Monitor swallowing and airway patency with patient fatigue and changes in neurological status  - Encourage and assist patient to increase activity and self care     - Encourage visually impaired, hearing impaired and aphasic patients to use assistive/communication devices  Outcome: Progressing     Problem: CARDIOVASCULAR - ADULT  Goal: Maintains optimal cardiac output and hemodynamic stability  Description: INTERVENTIONS:  - Monitor I/O, vital signs and rhythm  - Monitor for S/S and trends of decreased cardiac output  - Administer and titrate ordered vasoactive medications to optimize hemodynamic stability  - Assess quality of pulses, skin color and temperature  - Assess for signs of decreased coronary artery perfusion  - Instruct patient to report change in severity of symptoms  Outcome: Progressing  Goal: Absence of cardiac dysrhythmias or at baseline rhythm  Description: INTERVENTIONS:  - Continuous cardiac monitoring, vital signs, obtain 12 lead EKG if ordered  - Administer antiarrhythmic and heart rate control medications as ordered  - Monitor electrolytes and administer replacement therapy as ordered  Outcome: Progressing     Problem: RESPIRATORY - ADULT  Goal: Achieves optimal ventilation and oxygenation  Description: INTERVENTIONS:  - Assess for changes in respiratory status  - Assess for changes in mentation and behavior  - Position to facilitate oxygenation and minimize respiratory effort  - Oxygen administered by appropriate delivery if ordered  - Initiate smoking cessation education as indicated  - Encourage broncho-pulmonary hygiene including cough, deep breathe, Incentive Spirometry  - Assess the need for suctioning and aspirate as needed  - Assess and instruct to report SOB or any respiratory difficulty  - Respiratory Therapy support as indicated  Outcome: Progressing     Problem: GASTROINTESTINAL - ADULT  Goal: Minimal or absence of nausea and/or vomiting  Description: INTERVENTIONS:  - Administer IV fluids if ordered to ensure adequate hydration  - Maintain NPO status until nausea and vomiting are resolved  - Nasogastric tube if ordered  - Administer ordered antiemetic medications as needed  - Provide nonpharmacologic comfort measures as appropriate  - Advance diet as tolerated, if ordered  - Consider nutrition services referral to assist patient with adequate nutrition and appropriate food choices  Outcome: Progressing  Goal: Maintains or returns to baseline bowel function  Description: INTERVENTIONS:  - Assess bowel function  - Encourage oral fluids to ensure adequate hydration  - Administer IV fluids if ordered to ensure adequate hydration  - Administer ordered medications as needed  - Encourage mobilization and activity  - Consider nutritional services referral to assist patient with adequate nutrition and appropriate food choices  Outcome: Progressing  Goal: Maintains adequate nutritional intake  Description: INTERVENTIONS:  - Monitor percentage of each meal consumed  - Identify factors contributing to decreased intake, treat as appropriate  - Assist with meals as needed  - Monitor I&O, weight, and lab values if indicated  - Obtain nutrition services referral as needed  Outcome: Progressing     Problem: GENITOURINARY - ADULT  Goal: Maintains or returns to baseline urinary function  Description: INTERVENTIONS:  - Assess urinary function  - Encourage oral fluids to ensure adequate hydration if ordered  - Administer IV fluids as ordered to ensure adequate hydration  - Administer ordered medications as needed  - Offer frequent toileting  - Follow urinary retention protocol if ordered  Outcome: Progressing     Problem: METABOLIC, FLUID AND ELECTROLYTES - ADULT  Goal: Electrolytes maintained within normal limits  Description: INTERVENTIONS:  - Monitor labs and assess patient for signs and symptoms of electrolyte imbalances  - Administer electrolyte replacement as ordered  - Monitor response to electrolyte replacements, including repeat lab results as appropriate  - Instruct patient on fluid and nutrition as appropriate  Outcome: Progressing  Goal: Fluid balance maintained  Description: INTERVENTIONS:  - Monitor labs   - Monitor I/O and WT  - Instruct patient on fluid and nutrition as appropriate  - Assess for signs & symptoms of volume excess or deficit  Outcome: Progressing  Goal: Glucose maintained within target range  Description: INTERVENTIONS:  - Monitor Blood Glucose as ordered  - Assess for signs and symptoms of hyperglycemia and hypoglycemia  - Administer ordered medications to maintain glucose within target range  - Assess nutritional intake and initiate nutrition service referral as needed  Outcome: Progressing     Problem: SKIN/TISSUE INTEGRITY - ADULT  Goal: Skin integrity remains intact  Description: INTERVENTIONS  - Identify patients at risk for skin breakdown  - Assess and monitor skin integrity  - Assess and monitor nutrition and hydration status  - Monitor labs (i e  albumin)  - Assess for incontinence   - Turn and reposition patient  - Assist with mobility/ambulation  - Relieve pressure over bony prominences  - Avoid friction and shearing  - Provide appropriate hygiene as needed including keeping skin clean and dry  - Evaluate need for skin moisturizer/barrier cream  - Collaborate with interdisciplinary team (i e  Nutrition, Rehabilitation, etc )   - Patient/family teaching  Outcome: Progressing  Goal: Oral mucous membranes remain intact  Description: INTERVENTIONS  - Assess oral mucosa and hygiene practices  - Implement preventative oral hygiene regimen  - Implement oral medicated treatments as ordered  - Initiate Nutrition services referral as needed  Outcome: Progressing     Problem: HEMATOLOGIC - ADULT  Goal: Maintains hematologic stability  Description: INTERVENTIONS  - Assess for signs and symptoms of bleeding or hemorrhage  - Monitor labs  - Administer supportive blood products/factors as ordered and appropriate  Outcome: Progressing     Problem: MUSCULOSKELETAL - ADULT  Goal: Maintain or return mobility to safest level of function  Description: INTERVENTIONS:  - Assess patient's ability to carry out ADLs; assess patient's baseline for ADL function and identify physical deficits which impact ability to perform ADLs (bathing, care of mouth/teeth, toileting, grooming, dressing, etc )  - Assess/evaluate cause of self-care deficits   - Assess range of motion  - Assess patient's mobility  - Assess patient's need for assistive devices and provide as appropriate  - Encourage maximum independence but intervene and supervise when necessary  - Involve family in performance of ADLs  - Assess for home care needs following discharge   - Consider OT consult to assist with ADL evaluation and planning for discharge  - Provide patient education as appropriate  Outcome: Progressing     Problem: Potential for Falls  Goal: Patient will remain free of falls  Description: INTERVENTIONS:  - Assess patient frequently for physical needs  -  Identify cognitive and physical deficits and behaviors that affect risk of falls    -  Shaw fall precautions as indicated by assessment   - Educate patient/family on patient safety including physical limitations  - Instruct patient to call for assistance with activity based on assessment  - Modify environment to reduce risk of injury  - Consider OT/PT consult to assist with strengthening/mobility  Outcome: Progressing

## 2020-08-26 NOTE — PLAN OF CARE
Problem: NEUROSENSORY - ADULT  Goal: Achieves stable or improved neurological status  Description: INTERVENTIONS  - Monitor and report changes in neurological status  - Monitor vital signs such as temperature, blood pressure, glucose, and any other labs ordered   - Initiate measures to prevent increased intracranial pressure  - Monitor for seizure activity and implement precautions if appropriate      Outcome: Progressing  Goal: Achieves maximal functionality and self care  Description: INTERVENTIONS  - Monitor swallowing and airway patency with patient fatigue and changes in neurological status  - Encourage and assist patient to increase activity and self care     - Encourage visually impaired, hearing impaired and aphasic patients to use assistive/communication devices  Outcome: Progressing

## 2020-08-26 NOTE — DISCHARGE INSTRUCTIONS
How to Stop Smoking   WHAT YOU NEED TO KNOW:   Why should I stop smoking? You will improve your health and the health of others around you if you stop smoking  Your risk for heart and lung disease, cancer, stroke, heart attack, and vision problems will also decrease  You can benefit from quitting no matter how long you have smoked  How can I prepare to stop smoking? Nicotine is a highly addictive drug found in cigarettes  Withdrawal symptoms can happen when you stop smoking and make it hard to quit  These include anxiety, depression, irritability, trouble sleeping, and increased appetite  You increase your chances of success if you prepare to quit  · Set a quit date  [place] a date that is within the next 2 weeks  Do not pick a day that you think may be stressful or busy  Write down the day or [place] it on your calender  · Tell friends and family that you plan to quit  Explain that you may have withdrawal symptoms when you try to quit  Ask them to support you  They may be able to encourage you and help reduce your stress to make it easier for you to quit  · Make a list of your reasons for quitting  Put the list somewhere you will see it every day, such as your refrigerator  You can look at the list when you have a craving  · Remove all tobacco and nicotine products from your home, car, and workplace  Also, remove anything else that will tempt you to smoke, such as lighters, matches, or ashtrays  Clean your car, home, and places at work that smell like smoke  The smell of smoke can trigger a craving  · Identify triggers that make you want to smoke  This may include activities, feelings, or people  Also write down 1 way you can deal with each of your triggers  For example, if you want to smoke as soon as you wake up, plan another activity during this time, such as exercise  · Make a plan for how you will quit    Learn about the tools that can help you quit, such as medicine, counseling, or nicotine replacement therapy  Choose at least 2 options to help you quit  What are some tools to help me stop smoking? · Counseling  from a trained healthcare provider can provide you with support and skills to quit smoking  The provider will also teach you to manage your withdrawal symptoms and cravings  You may receive counseling from one counselor, in group therapy, or through phone therapy called a quit line  · Nicotine replacement therapy (NRT)  such as nicotine patches, gum, or lozenges may help reduce your nicotine cravings  You may get these without a doctor's order  Do not use e-cigarettes or smokeless tobacco in place of cigarettes or to help you quit  They still contain nicotine  · Prescription medicines  such as nasal sprays or nicotine inhalers may help reduce your withdrawal symptoms  Other medicines may also be used to reduce your urge to smoke  Ask your healthcare provider about these medicines  You may need to start certain medicines 2 weeks before your quit date for them to work well  · Hypnosis  is a practice that helps guide you through thoughts and feelings  Hypnosis may help decrease your cravings and make you more willing to quit  · Acupuncture therapy  uses very thin needles to balance energy channels in the body  This is thought to help decrease cravings and symptoms of nicotine withdrawal      · Support groups  let you talk to others who are trying to quit or have already quit  It may be helpful to speak with others about how they quit  How can I manage my cravings? · Avoid situations, people, and places that tempt you to smoke  Go to nonsmoking places, such as libraries or restaurants  Understand what tempts you and try to avoid these things  · Keep your hands busy  Hold things such as a stress ball or pen  · Put candy or toothpicks in your mouth  Keep lollipops, sugarless gum, or toothpicks with you at all times  · Do not have alcohol or caffeine    These drinks may tempt you to smoke  Drink healthy liquids such as water or juice instead  · Reward yourself when you resist your cravings  Rewards will motivate you and help you stay positive  · Do an activity that distracts you from your craving  Examples include going for a walk, exercising, or cleaning  What should I know about weight gain after I quit? You may gain a few pounds after you quit smoking  It is healthier for you to gain a few pounds than to continue to smoke  The following can help you prevent weight gain:  · Eat healthy foods  These include fruits, vegetables, whole-grain breads, low-fat dairy products, beans, lean meats, and fish  Eat healthy snacks, such as low-fat yogurt, if you get hungry between meals  · Drink water before, during, and between meals  This will make your stomach feel full and help prevent you from overeating  Ask your healthcare provider how much liquid to drink each day and which liquids are best for you  · Exercise  Take a walk or do some kind of exercise every day  Ask your healthcare provider what exercise is right for you  This may help reduce your cravings and reduce stress  Where can I find support and more information? · [employer]  Phone: 3- 930 - [phone]  Web Address: www [employer]  CARE AGREEMENT:   You have the right to help plan your care  Learn about your health condition and how it may be treated  Discuss treatment options with your caregivers to decide what care you want to receive  You always have the right to refuse treatment  The above information is an  only  It is not intended as medical advice for individual conditions or treatments  Talk to your doctor, nurse or pharmacist before following any medical regimen to see if it is safe and effective for you  © 2017 [employer] Information is for End User's use only and may not be sold, redistributed or otherwise used for commercial purposes   All illustrations and images included in CareNotes® are the copyrighted property of A D A M , Inc  or Farshad Chowdary

## 2020-08-26 NOTE — DISCHARGE SUMMARY
Discharge- Tim Jonas 1955, 76 y o  male MRN: [id number]    Unit/Bed#: -01 Encounter: [id number]    Primary Care Provider: Thelma Donovan DO   Date and time admitted to hospital: 8/22/2020  2:27 PM    * Sepsis due to pneumonia [facility])  Assessment & Plan  Admitted for sepsis secondary to pneumonia  Sepsis present on admission  Sepsis improved on discharge    Pneumonia of right lower lobe due to infectious organism  Assessment & Plan  Treated with IV antibiotics and showed significant improvement  Continue Augmentin for 5 more days  Continue to follow with primary care physician as an outpatient within 3-5 days post discharge    Acute systolic CHF (congestive heart failure) ([facility] [address] )  Assessment & Plan  Wt Readings from Last 3 Encounters:   08/26/20 69 8 kg (153 lb 14 1 oz)     HFrEF most likely 2/2 tachyarrhythmia related CM  Repeat echocardiogram as an outpatient once sinus rhythm maintain  Mildly hypervolemic, continue IV diuretic as per cardiology  Continue Lasix 20 mg daily and potassium supplement  Check BMP within 3-5 days post discharge and follow with primary care physician    New onset a-fib [facility])  Assessment & Plan  Successful cardioversion  Continue anticoagulation and metoprolol  Continue to follow with Cardiology as an outpatient    Discharging Physician / Practitioner: Ines Paget, MD  PCP: Thelma Donovan DO  Admission Date:   Admission Orders (From admission, onward)     Ordered        08/22/20 1642  Inpatient Admission  Once                   Discharge Date: 08/26/20    Disposition:      Other: Home    For Discharges to [facility] SNF:   · Not Applicable to this Patient - Not Applicable to this Patient    Reason for Admission:  Pneumonia, AFib RVR    Discharge Diagnoses:     Please see assessment and plan section above for further details regarding discharge diagnoses       Resolved Problems  Date Reviewed: 8/25/2020    None          Consultations During Hospital Stay:  IP CONSULT TO CARDIOLOGY     Procedures Performed:   * No surgery found *      Xr Chest Portable    Result Date: 8/25/2020  Narrative: CHEST INDICATION:   shortness of breath  COMPARISON:  8/22/2020 EXAM PERFORMED/VIEWS:  XR CHEST PORTABLE FINDINGS: Cardiomediastinal silhouette appears unremarkable  There is increased patchy density in the right lower lobe suspicious for worsening pneumonia  There is a small right pleural effusion, not present previously  Osseous structures appear within normal limits for patient age  Impression: Increased right basilar density with new small effusion, suspicious for worsening pneumonia and parapneumonic effusion  The study was marked in [facility] for immediate notification  Workstation performed: [id number]     Xr Chest 1 View Portable    Result Date: 8/22/2020  Narrative: CHEST INDICATION:   weakness  COMPARISON:  None EXAM PERFORMED/VIEWS:  XR CHEST PORTABLE FINDINGS: Cardiomediastinal silhouette appears unremarkable  Patchy right lower lung zone airspace opacity suspicious for pneumonia  No pneumothorax  No pleural effusion  Osseous structures appear within normal limits for patient age  Impression: Patchy right lower lung zone airspace opacity suspicious for pneumonia  Workstation performed: [id number]        Medication Adjustments and Discharge Medications:  · Summary of Medication Adjustments made as a result of this hospitalization:  Metoprolol, Eliquis, Augmentin, Lasix, potassium  · Medication Dosing Tapers - Please refer to Discharge Medication List for details on any medication dosing tapers (if applicable to patient)  · Discharge Medication List: See after visit summary for reconciled discharge medications  Wound Care Recommendations:  When applicable, please see wound care section of After Visit Summary      Diet Recommendations at Discharge:  Diet -        Diet Orders   (From admission, onward)             Start     Ordered    08/25/20 0916  Diet Cardiovascular; Cardiac; Sodium 2 GM  Diet effective now     Question Answer Comment   Diet Type Cardiovascular    Cardiac Cardiac    Other Restriction(s): Sodium 2 GM    RD to adjust diet per protocol? Yes        08/25/20 0915                  Incidental Findings:   · None     Test Results Pending at Discharge (will require follow up): · None         Hospital Course:     Nina Carballo is a 76 y o  male patient who originally presented to the hospital on 8/22/2020 due to sepsis secondary to pneumonia associated with AFib RVR  Treated with IV antibiotics with good clinical outcome  Patient will be sent home with Augmentin to finish antibiotic course for 5 more days  Successful cardioversion, continued on Eliquis and metoprolol  On discharge patient with sinus rhythm rate controlled  Echocardiogram showed low ejection fraction in the setting of AFib RVR, recommendation to repeat echocardiogram when patient maintain sinus  Patient will be sent home with 20 mg Lasix and potassium supplement to follow-up with cardiology and primary care physician as an outpatient  Repeat BMP within 3-5 days post discharge and follow with primary care physician      Condition at Discharge: stable     Discharge Day Visit / Exam:     Subjective:  No complaints  Vitals: Blood Pressure: 158/83 (08/26/20 0700)  Pulse: 67 (08/26/20 0700)  Temperature: 98 9 °F (37 2 °C) (08/26/20 0700)  Temp Source: Oral (08/26/20 0700)  Respirations: 20 (08/26/20 0700)  Height: 5' 6" (167 6 cm) (08/22/20 1825)  Weight - Scale: 69 8 kg (153 lb 14 1 oz) (08/26/20 0455)  SpO2: 96 % (08/26/20 0700)  Exam:     General appearance: alert, appears stated age and cooperative  Head: Normocephalic, without obvious abnormality, atraumatic  Lungs: clear to auscultation bilaterally  Heart: regular rate and rhythm  Abdomen: soft, non-tender, positive bowel sounds   Back: negative  Extremities: extremities atraumatic, no cyanosis or edema  Neurologic: Alert and oriented X 3, normal strength and tone  Normal symmetric reflexes  Normal coordination and gait      Discharge instructions/Information to patient and family:   See after visit summary section titled Discharge Instructions for information provided to patient and family  Planned Readmission:  No      Discharge Statement:  I spent 35 minutes discharging the patient  This time was spent on the day of discharge  I had direct contact with the patient on the day of discharge  Greater than 50% of the total time was spent examining patient, answering all patient questions, arranging and discussing plan of care with patient as well as directly providing post-discharge instructions  Additional time then spent on discharge activities      ** Please Note: This note has been constructed using a voice recognition system **

## 2020-10-23 PROBLEM — I50.23 ACUTE ON CHRONIC SYSTOLIC HEART FAILURE (HCC): Status: ACTIVE | Noted: 2020-01-01

## 2020-10-23 PROBLEM — I81 PORTAL VEIN THROMBOSIS: Status: ACTIVE | Noted: 2020-01-01

## 2020-10-23 PROBLEM — I50.22 CHRONIC SYSTOLIC HEART FAILURE (HCC): Status: ACTIVE | Noted: 2020-01-01

## 2020-10-23 PROBLEM — E87.1 HYPONATREMIA: Status: ACTIVE | Noted: 2020-01-01

## 2020-10-23 PROBLEM — E87.2 LACTIC ACIDOSIS: Status: ACTIVE | Noted: 2020-01-01

## 2020-10-23 PROBLEM — C80.1 CANCER (HCC): Status: ACTIVE | Noted: 2020-01-01

## 2020-10-24 PROBLEM — C79.9 METASTATIC CANCER (HCC): Status: ACTIVE | Noted: 2020-01-01

## 2020-10-24 PROBLEM — J90 PLEURAL EFFUSION: Status: ACTIVE | Noted: 2020-01-01

## 2020-10-26 PROBLEM — E43 SEVERE PROTEIN-CALORIE MALNUTRITION (HCC): Status: ACTIVE | Noted: 2020-01-01

## 2020-10-26 PROBLEM — R41.82 ALTERED MENTAL STATUS: Status: ACTIVE | Noted: 2020-01-01

## 2020-10-27 PROBLEM — E83.52 HYPERCALCEMIA: Status: ACTIVE | Noted: 2020-01-01

## 2020-10-27 PROBLEM — G93.40 ENCEPHALOPATHY: Status: ACTIVE | Noted: 2020-01-01

## 2020-10-27 PROBLEM — I63.9 EMBOLIC STROKE (HCC): Status: ACTIVE | Noted: 2020-01-01

## 2020-10-29 PROBLEM — C25.9 PANCREATIC ADENOCARCINOMA (HCC): Status: ACTIVE | Noted: 2020-01-01

## 2020-10-30 PROBLEM — Z51.5 HOSPICE CARE PATIENT: Status: ACTIVE | Noted: 2020-01-01

## 2020-11-01 LAB — PTH RELATED PROT SERPL-SCNC: <2 PMOL/L

## 2021-01-09 NOTE — ASSESSMENT & PLAN NOTE
Admitted for sepsis secondary to pneumonia  Sepsis present on admission  [facility]    Progress Note    Justine Cotter Patient Status:  Inpatient    3/26/1945 MRN [id number]   Location [facility] 2W/SW Attending Britany White MD   Hosp Day # 8 PCP Holly Ivory MD         Assessment and Lab Results   Component Value Date    WBC 5.7 01/08/2021    HGB 9.3 (L) 01/08/2021    HCT 29.2 (L) 01/08/2021    .0 01/08/2021    CREATSERUM 2.35 (H) 01/08/2021    BUN 13 01/08/2021     01/08/2021    K 4.1 01/09/2021     01/08/2021

## 2024-12-07 NOTE — ANESTHESIA POSTPROCEDURE EVALUATION
Post-Op Assessment Note    CV Status:  Stable  Pain Score: 0    Pain management: adequate     Mental Status:  Alert   Hydration Status:  Stable   PONV Controlled:  None   Airway Patency:  Patent and adequate   Two or more mitigation strategies used for obstructive sleep apnea   Post Op Vitals Reviewed: Yes      Staff: Anesthesiologist         No complications documented      /69 (08/25/20 0845)    Temp   97 4 F   Pulse 73 (08/25/20 0845)   Resp (!) 26 (08/25/20 0845)    SpO2 100 % (08/25/20 0845)
10-Nov-2024 13:30

## 2025-04-10 NOTE — PLAN OF CARE
Follow Up:      Interval History:    REVIEW OF SYSTEMS  [  ] ROS unobtainable because:    [  ] All other systems negative except as noted below    Constitutional:  [ ] fever [ ] chills  [ ] weight loss  [ ] weakness  Skin:  [ ] rash [ ] phlebitis	  Eyes: [ ] icterus [ ] pain  [ ] discharge	  ENMT: [ ] sore throat  [ ] thrush [ ] ulcers [ ] exudates  Respiratory: [ ] dyspnea [ ] hemoptysis [ ] cough [ ] sputum	  Cardiovascular:  [ ] chest pain [ ] palpitations [ ] edema	  Gastrointestinal:  [ ] nausea [ ] vomiting [ ] diarrhea [ ] constipation [ ] pain	  Genitourinary:  [ ] dysuria [ ] frequency [ ] hematuria [ ] discharge [ ] flank pain  [ ] incontinence  Musculoskeletal:  [ ] myalgias [ ] arthralgias [ ] arthritis  [ ] back pain  Neurological:  [ ] headache [ ] seizures  [ ] confusion/altered mental status    Allergies  vancomycin (Rash)  penicillin (Rash)  adhesives (Rash)        ANTIMICROBIALS:  linezolid    Tablet 600 every 12 hours      OTHER MEDS:  MEDICATIONS  (STANDING):  acetaminophen     Tablet .. 1000 every 6 hours PRN  acetaminophen   IVPB .. 1000 once  albuterol    90 MICROgram(s) HFA Inhaler 2 every 6 hours PRN  albuterol/ipratropium for Nebulization 3 every 6 hours PRN  buDESOnide    Inhalation Suspension 0.5 daily  influenza   Vaccine 0.5 once  melatonin 9 at bedtime PRN  midodrine. 20 three times a day  montelukast 10 daily  oxyCODONE    IR 2.5 every 6 hours PRN  pantoprazole    Tablet 40 before breakfast  polyethylene glycol 3350 17 two times a day  rivaroxaban 15 with dinner  senna 2 at bedtime      Vital Signs Last 24 Hrs  T(C): 36.7 (10 Apr 2025 10:53), Max: 37.1 (10 Apr 2025 00:08)  T(F): 98 (10 Apr 2025 10:53), Max: 98.7 (10 Apr 2025 00:08)  HR: 92 (10 Apr 2025 10:53) (82 - 92)  BP: 111/71 (10 Apr 2025 10:53) (91/62 - 111/71)  BP(mean): --  RR: 18 (10 Apr 2025 10:53) (18 - 18)  SpO2: 98% (10 Apr 2025 10:53) (98% - 100%)    Parameters below as of 10 Apr 2025 10:53  Patient On (Oxygen Delivery Method): nasal cannula  O2 Flow (L/min): 2      PHYSICAL EXAMINATION:  General: Alert and Awake, NAD  HEENT: PERRL, EOMI  Neck: Supple  Cardiac: RRR, No M/R/G  Resp: CTAB, No Wh/Rh/Ra  Abdomen: NBS, NT/ND, No HSM, No rigidity or guarding  MSK: No LE edema. No Calf tenderness  : No lucas  Skin: No rashes or lesions. Skin is warm and dry to the touch.   Neuro: Alert and Awake. CN 2-12 Grossly intact. Moves all four extremities spontaneously.  Psych: Calm, Pleasant, Cooperative                          12.5   10.67 )-----------( 429      ( 10 Apr 2025 14:05 )             39.9       04-10    133[L]  |  99  |  8   ----------------------------<  85  4.4   |  22  |  1.01    Ca    8.7      10 Apr 2025 14:05  Phos  2.7     04-10  Mg     2.0     04-10    TPro  7.1  /  Alb  3.9  /  TBili  0.9  /  DBili  x   /  AST  9[L]  /  ALT  5[L]  /  AlkPhos  63  04-10      Urinalysis Basic - ( 10 Apr 2025 14:05 )    Color: x / Appearance: x / SG: x / pH: x  Gluc: 85 mg/dL / Ketone: x  / Bili: x / Urobili: x   Blood: x / Protein: x / Nitrite: x   Leuk Esterase: x / RBC: x / WBC x   Sq Epi: x / Non Sq Epi: x / Bacteria: x        MICROBIOLOGY:  v  Blood Blood  04-08-25   No growth at 24 hours  --  --      Blood Blood  04-08-25   No growth at 24 hours  --  --      Abscess abdominal wall  04-03-25   Moderate Methicillin Resistant Staphylococcus aureus  --  Methicillin resistant Staphylococcus aureus      Blood Blood-Peripheral  04-02-25   No growth at 5 days  --  --      Blood Blood-Peripheral  04-02-25   No growth at 5 days  --  --        Toxoplasma IgG Screen: <3.00 IU/mL (10-25-24 @ 07:14)  HIV-1 RNA Quantitative, Viral Load Log: NOT DET. lg /mL (10-22-24 @ 17:39)  CMV IgG Antibody: >10.00 U/mL (10-22-24 @ 15:11)          RADIOLOGY:    <The imaging below has been reviewed and visualized by me independently. Findings as detailed in report below> Problem: NEUROSENSORY - ADULT  Goal: Achieves stable or improved neurological status  Description: INTERVENTIONS  - Monitor and report changes in neurological status  - Monitor vital signs such as temperature, blood pressure, glucose, and any other labs ordered   - Initiate measures to prevent increased intracranial pressure  - Monitor for seizure activity and implement precautions if appropriate      Outcome: Progressing  Goal: Achieves maximal functionality and self care  Description: INTERVENTIONS  - Monitor swallowing and airway patency with patient fatigue and changes in neurological status  - Encourage and assist patient to increase activity and self care     - Encourage visually impaired, hearing impaired and aphasic patients to use assistive/communication devices  Outcome: Progressing     Problem: CARDIOVASCULAR - ADULT  Goal: Maintains optimal cardiac output and hemodynamic stability  Description: INTERVENTIONS:  - Monitor I/O, vital signs and rhythm  - Monitor for S/S and trends of decreased cardiac output  - Administer and titrate ordered vasoactive medications to optimize hemodynamic stability  - Assess quality of pulses, skin color and temperature  - Assess for signs of decreased coronary artery perfusion  - Instruct patient to report change in severity of symptoms  Outcome: Progressing  Goal: Absence of cardiac dysrhythmias or at baseline rhythm  Description: INTERVENTIONS:  - Continuous cardiac monitoring, vital signs, obtain 12 lead EKG if ordered  - Administer antiarrhythmic and heart rate control medications as ordered  - Monitor electrolytes and administer replacement therapy as ordered  Outcome: Progressing     Problem: RESPIRATORY - ADULT  Goal: Achieves optimal ventilation and oxygenation  Description: INTERVENTIONS:  - Assess for changes in respiratory status  - Assess for changes in mentation and behavior  - Position to facilitate oxygenation and minimize respiratory effort  - Oxygen administered by appropriate delivery if ordered  - Initiate smoking cessation education as indicated  - Encourage broncho-pulmonary hygiene including cough, deep breathe, Incentive Spirometry  - Assess the need for suctioning and aspirate as needed  - Assess and instruct to report SOB or any respiratory difficulty  - Respiratory Therapy support as indicated  Outcome: Progressing     Problem: GASTROINTESTINAL - ADULT  Goal: Minimal or absence of nausea and/or vomiting  Description: INTERVENTIONS:  - Administer IV fluids if ordered to ensure adequate hydration  - Maintain NPO status until nausea and vomiting are resolved  - Nasogastric tube if ordered  - Administer ordered antiemetic medications as needed  - Provide nonpharmacologic comfort measures as appropriate  - Advance diet as tolerated, if ordered  - Consider nutrition services referral to assist patient with adequate nutrition and appropriate food choices  Outcome: Progressing  Goal: Maintains or returns to baseline bowel function  Description: INTERVENTIONS:  - Assess bowel function  - Encourage oral fluids to ensure adequate hydration  - Administer IV fluids if ordered to ensure adequate hydration  - Administer ordered medications as needed  - Encourage mobilization and activity  - Consider nutritional services referral to assist patient with adequate nutrition and appropriate food choices  Outcome: Progressing  Goal: Maintains adequate nutritional intake  Description: INTERVENTIONS:  - Monitor percentage of each meal consumed  - Identify factors contributing to decreased intake, treat as appropriate  - Assist with meals as needed  - Monitor I&O, weight, and lab values if indicated  - Obtain nutrition services referral as needed  Outcome: Progressing     Problem: GENITOURINARY - ADULT  Goal: Maintains or returns to baseline urinary function  Description: INTERVENTIONS:  - Assess urinary function  - Encourage oral fluids to ensure adequate hydration if ordered  - Administer IV fluids as ordered to ensure adequate hydration  - Administer ordered medications as needed  - Offer frequent toileting  - Follow urinary retention protocol if ordered  Outcome: Progressing     Problem: METABOLIC, FLUID AND ELECTROLYTES - ADULT  Goal: Electrolytes maintained within normal limits  Description: INTERVENTIONS:  - Monitor labs and assess patient for signs and symptoms of electrolyte imbalances  - Administer electrolyte replacement as ordered  - Monitor response to electrolyte replacements, including repeat lab results as appropriate  - Instruct patient on fluid and nutrition as appropriate  Outcome: Progressing  Goal: Fluid balance maintained  Description: INTERVENTIONS:  - Monitor labs   - Monitor I/O and WT  - Instruct patient on fluid and nutrition as appropriate  - Assess for signs & symptoms of volume excess or deficit  Outcome: Progressing  Goal: Glucose maintained within target range  Description: INTERVENTIONS:  - Monitor Blood Glucose as ordered  - Assess for signs and symptoms of hyperglycemia and hypoglycemia  - Administer ordered medications to maintain glucose within target range  - Assess nutritional intake and initiate nutrition service referral as needed  Outcome: Progressing     Problem: SKIN/TISSUE INTEGRITY - ADULT  Goal: Skin integrity remains intact  Description: INTERVENTIONS  - Identify patients at risk for skin breakdown  - Assess and monitor skin integrity  - Assess and monitor nutrition and hydration status  - Monitor labs (i e  albumin)  - Assess for incontinence   - Turn and reposition patient  - Assist with mobility/ambulation  - Relieve pressure over bony prominences  - Avoid friction and shearing  - Provide appropriate hygiene as needed including keeping skin clean and dry  - Evaluate need for skin moisturizer/barrier cream  - Collaborate with interdisciplinary team (i e  Nutrition, Rehabilitation, etc )   - Patient/family teaching  Outcome: Follow Up:  intraabdominal abscess    Interval History: afebrile. noting drainage from left sided wound (prior infusion site)    REVIEW OF SYSTEMS  [  ] ROS unobtainable because:    [ x ] All other systems negative except as noted below    Constitutional:  [ ] fever [ ] chills  [ ] weight loss  [ ] weakness  Skin:  [ ] rash [ ] phlebitis +noting drainage from left sided wound (prior infusion site)  Eyes: [ ] icterus [ ] pain  [ ] discharge	  ENMT: [ ] sore throat  [ ] thrush [ ] ulcers [ ] exudates  Respiratory: [ ] dyspnea [ ] hemoptysis [ ] cough [ ] sputum	  Cardiovascular:  [ ] chest pain [ ] palpitations [ ] edema	  Gastrointestinal:  [ ] nausea [ ] vomiting [ ] diarrhea [ ] constipation [ ] pain	  Genitourinary:  [ ] dysuria [ ] frequency [ ] hematuria [ ] discharge [ ] flank pain  [ ] incontinence  Musculoskeletal:  [ ] myalgias [ ] arthralgias [ ] arthritis  [ ] back pain  Neurological:  [ ] headache [ ] seizures  [ ] confusion/altered mental status    Allergies  vancomycin (Rash)  penicillin (Rash)  adhesives (Rash)        ANTIMICROBIALS:  linezolid    Tablet 600 every 12 hours      OTHER MEDS:  MEDICATIONS  (STANDING):  acetaminophen     Tablet .. 1000 every 6 hours PRN  acetaminophen   IVPB .. 1000 once  albuterol    90 MICROgram(s) HFA Inhaler 2 every 6 hours PRN  albuterol/ipratropium for Nebulization 3 every 6 hours PRN  buDESOnide    Inhalation Suspension 0.5 daily  influenza   Vaccine 0.5 once  melatonin 9 at bedtime PRN  midodrine. 20 three times a day  montelukast 10 daily  oxyCODONE    IR 2.5 every 6 hours PRN  pantoprazole    Tablet 40 before breakfast  polyethylene glycol 3350 17 two times a day  rivaroxaban 15 with dinner  senna 2 at bedtime      Vital Signs Last 24 Hrs  T(C): 36.7 (10 Apr 2025 10:53), Max: 37.1 (10 Apr 2025 00:08)  T(F): 98 (10 Apr 2025 10:53), Max: 98.7 (10 Apr 2025 00:08)  HR: 92 (10 Apr 2025 10:53) (82 - 92)  BP: 111/71 (10 Apr 2025 10:53) (91/62 - 111/71)  BP(mean): --  RR: 18 (10 Apr 2025 10:53) (18 - 18)  SpO2: 98% (10 Apr 2025 10:53) (98% - 100%)    Parameters below as of 10 Apr 2025 10:53  Patient On (Oxygen Delivery Method): nasal cannula  O2 Flow (L/min): 2      PHYSICAL EXAMINATION:  General: Alert and Awake, NAD  HEENT: Normocephalic / Atraumatic  Resp: No accessory muscles of respiration utilized  Abdomen: +left sided prior infusion pump site with hemopurulent drainage. Dressing over right sided abdominal wall abscess NBS, No HSM, No rigidity or guarding  MSK: No LE edema.   : No lucas  Skin: No rashes or lesions.    Neuro: Alert and Awake. CN 2-12 Grossly intact. Moves all four extremities spontaneously.  Psych: Calm, Pleasant, Cooperative                            12.5   10.67 )-----------( 429      ( 10 Apr 2025 14:05 )             39.9       04-10    133[L]  |  99  |  8   ----------------------------<  85  4.4   |  22  |  1.01    Ca    8.7      10 Apr 2025 14:05  Phos  2.7     04-10  Mg     2.0     04-10    TPro  7.1  /  Alb  3.9  /  TBili  0.9  /  DBili  x   /  AST  9[L]  /  ALT  5[L]  /  AlkPhos  63  04-10      Urinalysis Basic - ( 10 Apr 2025 14:05 )    Color: x / Appearance: x / SG: x / pH: x  Gluc: 85 mg/dL / Ketone: x  / Bili: x / Urobili: x   Blood: x / Protein: x / Nitrite: x   Leuk Esterase: x / RBC: x / WBC x   Sq Epi: x / Non Sq Epi: x / Bacteria: x        MICROBIOLOGY:  v  Blood Blood  04-08-25   No growth at 24 hours  --  --      Blood Blood  04-08-25   No growth at 24 hours  --  --      Abscess abdominal wall  04-03-25   Moderate Methicillin Resistant Staphylococcus aureus  --  Methicillin resistant Staphylococcus aureus      Blood Blood-Peripheral  04-02-25   No growth at 5 days  --  --      Blood Blood-Peripheral  04-02-25   No growth at 5 days  --  --        Toxoplasma IgG Screen: <3.00 IU/mL (10-25-24 @ 07:14)  HIV-1 RNA Quantitative, Viral Load Log: NOT DET. lg /mL (10-22-24 @ 17:39)  CMV IgG Antibody: >10.00 U/mL (10-22-24 @ 15:11)          RADIOLOGY:    <The imaging below has been reviewed and visualized by me independently. Findings as detailed in report below>    < from: Xray Chest 2 Views PA/Lat (04.08.25 @ 20:08) >  IMPRESSION:  No focal consolidations.    < end of copied text >   Progressing  Goal: Oral mucous membranes remain intact  Description: INTERVENTIONS  - Assess oral mucosa and hygiene practices  - Implement preventative oral hygiene regimen  - Implement oral medicated treatments as ordered  - Initiate Nutrition services referral as needed  Outcome: Progressing     Problem: HEMATOLOGIC - ADULT  Goal: Maintains hematologic stability  Description: INTERVENTIONS  - Assess for signs and symptoms of bleeding or hemorrhage  - Monitor labs  - Administer supportive blood products/factors as ordered and appropriate  Outcome: Progressing     Problem: MUSCULOSKELETAL - ADULT  Goal: Maintain or return mobility to safest level of function  Description: INTERVENTIONS:  - Assess patient's ability to carry out ADLs; assess patient's baseline for ADL function and identify physical deficits which impact ability to perform ADLs (bathing, care of mouth/teeth, toileting, grooming, dressing, etc )  - Assess/evaluate cause of self-care deficits   - Assess range of motion  - Assess patient's mobility  - Assess patient's need for assistive devices and provide as appropriate  - Encourage maximum independence but intervene and supervise when necessary  - Involve family in performance of ADLs  - Assess for home care needs following discharge   - Consider OT consult to assist with ADL evaluation and planning for discharge  - Provide patient education as appropriate  Outcome: Progressing     Problem: Potential for Falls  Goal: Patient will remain free of falls  Description: INTERVENTIONS:  - Assess patient frequently for physical needs  -  Identify cognitive and physical deficits and behaviors that affect risk of falls    -  [place] fall precautions as indicated by assessment   - Educate patient/family on patient safety including physical limitations  - Instruct patient to call for assistance with activity based on assessment  - Modify environment to reduce risk of injury  - Consider OT/PT consult to assist with strengthening/mobility  Outcome: Progressing